# Patient Record
Sex: MALE | Race: BLACK OR AFRICAN AMERICAN | NOT HISPANIC OR LATINO | Employment: OTHER | URBAN - METROPOLITAN AREA
[De-identification: names, ages, dates, MRNs, and addresses within clinical notes are randomized per-mention and may not be internally consistent; named-entity substitution may affect disease eponyms.]

---

## 2017-03-07 ENCOUNTER — HISTORICAL (OUTPATIENT)
Dept: LAB | Facility: HOSPITAL | Age: 67
End: 2017-03-07

## 2017-07-18 ENCOUNTER — HISTORICAL (OUTPATIENT)
Dept: ADMINISTRATIVE | Facility: HOSPITAL | Age: 67
End: 2017-07-18

## 2017-12-04 ENCOUNTER — HISTORICAL (OUTPATIENT)
Dept: ADMINISTRATIVE | Facility: HOSPITAL | Age: 67
End: 2017-12-04

## 2017-12-28 ENCOUNTER — HISTORICAL (OUTPATIENT)
Dept: PREADMISSION TESTING | Facility: HOSPITAL | Age: 67
End: 2017-12-28

## 2017-12-28 LAB
ABS NEUT (OLG): 2.21 X10(3)/MCL (ref 2.1–9.2)
ALBUMIN SERPL-MCNC: 4 GM/DL (ref 3.4–5)
ALBUMIN/GLOB SERPL: 1.1 {RATIO}
ALP SERPL-CCNC: 50 UNIT/L (ref 50–136)
ALT SERPL-CCNC: 26 UNIT/L (ref 12–78)
APPEARANCE, UA: CLEAR
APTT PPP: 30.5 SECOND(S) (ref 24.8–36.9)
AST SERPL-CCNC: 17 UNIT/L (ref 15–37)
BACTERIA SPEC CULT: ABNORMAL /HPF
BASOPHILS # BLD AUTO: 0.1 X10(3)/MCL (ref 0–0.2)
BASOPHILS NFR BLD AUTO: 2 %
BILIRUB SERPL-MCNC: 0.7 MG/DL (ref 0.2–1)
BILIRUB UR QL STRIP: NEGATIVE
BILIRUBIN DIRECT+TOT PNL SERPL-MCNC: 0.1 MG/DL (ref 0–0.2)
BILIRUBIN DIRECT+TOT PNL SERPL-MCNC: 0.6 MG/DL (ref 0–0.8)
BUN SERPL-MCNC: 12 MG/DL (ref 7–18)
CALCIUM SERPL-MCNC: 9 MG/DL (ref 8.5–10.1)
CHLORIDE SERPL-SCNC: 105 MMOL/L (ref 98–107)
CHOLEST SERPL-MCNC: 249 MG/DL (ref 0–200)
CHOLEST/HDLC SERPL: 5.7 {RATIO} (ref 0–5)
CO2 SERPL-SCNC: 24 MMOL/L (ref 21–32)
COLOR UR: YELLOW
CREAT SERPL-MCNC: 1.08 MG/DL (ref 0.7–1.3)
EOSINOPHIL # BLD AUTO: 0.2 X10(3)/MCL (ref 0–0.9)
EOSINOPHIL NFR BLD AUTO: 4 %
ERYTHROCYTE [DISTWIDTH] IN BLOOD BY AUTOMATED COUNT: 12.7 % (ref 11.5–17)
GLOBULIN SER-MCNC: 3.8 GM/DL (ref 2.4–3.5)
GLUCOSE (UA): NEGATIVE
GLUCOSE SERPL-MCNC: 104 MG/DL (ref 74–106)
HCT VFR BLD AUTO: 45.3 % (ref 42–52)
HDLC SERPL-MCNC: 44 MG/DL (ref 35–60)
HGB BLD-MCNC: 15 GM/DL (ref 14–18)
HGB UR QL STRIP: ABNORMAL
INR PPP: 1.02 (ref 0–1.27)
KETONES UR QL STRIP: NEGATIVE
LDLC SERPL CALC-MCNC: 166 MG/DL (ref 0–129)
LEUKOCYTE ESTERASE UR QL STRIP: NEGATIVE
LYMPHOCYTES # BLD AUTO: 2.3 X10(3)/MCL (ref 0.6–4.6)
LYMPHOCYTES NFR BLD AUTO: 44 %
MCH RBC QN AUTO: 30.7 PG (ref 27–31)
MCHC RBC AUTO-ENTMCNC: 33.1 GM/DL (ref 33–36)
MCV RBC AUTO: 92.6 FL (ref 80–94)
MONOCYTES # BLD AUTO: 0.4 X10(3)/MCL (ref 0.1–1.3)
MONOCYTES NFR BLD AUTO: 8 %
MRSA SCREEN BY PCR: NEGATIVE
NEUTROPHILS # BLD AUTO: 2.21 X10(3)/MCL (ref 1.4–7.9)
NEUTROPHILS NFR BLD AUTO: 42 %
NITRITE UR QL STRIP: NEGATIVE
PH UR STRIP: 6 [PH] (ref 5–9)
PLATELET # BLD AUTO: 197 X10(3)/MCL (ref 130–400)
PMV BLD AUTO: 11.5 FL (ref 9.4–12.4)
POTASSIUM SERPL-SCNC: 4.1 MMOL/L (ref 3.5–5.1)
PROT SERPL-MCNC: 7.8 GM/DL (ref 6.4–8.2)
PROT UR QL STRIP: NEGATIVE
PROTHROMBIN TIME: 13.7 SECOND(S) (ref 12.2–14.7)
PSA SERPL-MCNC: 2.04 NG/ML (ref 0–4)
RBC # BLD AUTO: 4.89 X10(6)/MCL (ref 4.7–6.1)
RBC #/AREA URNS HPF: ABNORMAL /[HPF]
SODIUM SERPL-SCNC: 140 MMOL/L (ref 136–145)
SP GR UR STRIP: 1.02 (ref 1–1.03)
SQUAMOUS EPITHELIAL, UA: ABNORMAL
TRANSFERRIN SERPL-MCNC: 246 MG/DL (ref 200–360)
TRIGL SERPL-MCNC: 197 MG/DL (ref 30–150)
TSH SERPL-ACNC: 2.72 MIU/ML (ref 0.36–3.74)
UROBILINOGEN UR STRIP-ACNC: 1
VLDLC SERPL CALC-MCNC: 39 MG/DL
WBC # SPEC AUTO: 5.2 X10(3)/MCL (ref 4.5–11.5)
WBC #/AREA URNS HPF: ABNORMAL /HPF

## 2018-02-20 ENCOUNTER — HISTORICAL (OUTPATIENT)
Dept: ADMINISTRATIVE | Facility: HOSPITAL | Age: 68
End: 2018-02-20

## 2018-02-22 ENCOUNTER — HISTORICAL (OUTPATIENT)
Dept: ADMINISTRATIVE | Facility: HOSPITAL | Age: 68
End: 2018-02-22

## 2018-02-22 ENCOUNTER — HISTORICAL (OUTPATIENT)
Dept: LAB | Facility: HOSPITAL | Age: 68
End: 2018-02-22

## 2018-02-22 LAB
ABS NEUT (OLG): 3.68 X10(3)/MCL (ref 2.1–9.2)
ALBUMIN SERPL-MCNC: 3.7 GM/DL (ref 3.4–5)
ALBUMIN/GLOB SERPL: 1 {RATIO}
ALP SERPL-CCNC: 64 UNIT/L (ref 50–136)
ALT SERPL-CCNC: 18 UNIT/L (ref 12–78)
APPEARANCE, UA: CLEAR
APTT PPP: 34.2 SECOND(S) (ref 24.8–36.9)
AST SERPL-CCNC: 12 UNIT/L (ref 15–37)
BACTERIA SPEC CULT: ABNORMAL /HPF
BASOPHILS # BLD AUTO: 0.1 X10(3)/MCL (ref 0–0.2)
BASOPHILS NFR BLD AUTO: 1 %
BILIRUB SERPL-MCNC: 0.4 MG/DL (ref 0.2–1)
BILIRUB UR QL STRIP: ABNORMAL
BILIRUBIN DIRECT+TOT PNL SERPL-MCNC: 0.1 MG/DL (ref 0–0.2)
BILIRUBIN DIRECT+TOT PNL SERPL-MCNC: 0.3 MG/DL (ref 0–0.8)
BUN SERPL-MCNC: 14 MG/DL (ref 7–18)
CALCIUM SERPL-MCNC: 9.5 MG/DL (ref 8.5–10.1)
CHLORIDE SERPL-SCNC: 105 MMOL/L (ref 98–107)
CO2 SERPL-SCNC: 26 MMOL/L (ref 21–32)
COLOR UR: YELLOW
CREAT SERPL-MCNC: 1.1 MG/DL (ref 0.7–1.3)
EOSINOPHIL # BLD AUTO: 0.2 X10(3)/MCL (ref 0–0.9)
EOSINOPHIL NFR BLD AUTO: 2 %
ERYTHROCYTE [DISTWIDTH] IN BLOOD BY AUTOMATED COUNT: 12.1 % (ref 11.5–17)
GLOBULIN SER-MCNC: 3.8 GM/DL (ref 2.4–3.5)
GLUCOSE (UA): NEGATIVE
GLUCOSE SERPL-MCNC: 146 MG/DL (ref 74–106)
HCT VFR BLD AUTO: 37.6 % (ref 42–52)
HGB BLD-MCNC: 12.2 GM/DL (ref 14–18)
HGB UR QL STRIP: ABNORMAL
INR PPP: 1.13 (ref 0–1.27)
KETONES UR QL STRIP: NEGATIVE
LEUKOCYTE ESTERASE UR QL STRIP: NEGATIVE
LYMPHOCYTES # BLD AUTO: 2.1 X10(3)/MCL (ref 0.6–4.6)
LYMPHOCYTES NFR BLD AUTO: 33 %
MCH RBC QN AUTO: 31.6 PG (ref 27–31)
MCHC RBC AUTO-ENTMCNC: 32.4 GM/DL (ref 33–36)
MCV RBC AUTO: 97.4 FL (ref 80–94)
MONOCYTES # BLD AUTO: 0.3 X10(3)/MCL (ref 0.1–1.3)
MONOCYTES NFR BLD AUTO: 5 %
NEUTROPHILS # BLD AUTO: 3.68 X10(3)/MCL (ref 1.4–7.9)
NEUTROPHILS NFR BLD AUTO: 58 %
NITRITE UR QL STRIP: NEGATIVE
PH UR STRIP: 5.5 [PH] (ref 5–9)
PLATELET # BLD AUTO: 302 X10(3)/MCL (ref 130–400)
PMV BLD AUTO: 9.8 FL (ref 9.4–12.4)
POTASSIUM SERPL-SCNC: 4 MMOL/L (ref 3.5–5.1)
PROT SERPL-MCNC: 7.5 GM/DL (ref 6.4–8.2)
PROT UR QL STRIP: NEGATIVE
PROTHROMBIN TIME: 14.9 SECOND(S) (ref 12.2–14.7)
RBC # BLD AUTO: 3.86 X10(6)/MCL (ref 4.7–6.1)
RBC #/AREA URNS HPF: 2 /HPF (ref 0–2)
SODIUM SERPL-SCNC: 139 MMOL/L (ref 136–145)
SP GR UR STRIP: 1.02 (ref 1–1.03)
SQUAMOUS EPITHELIAL, UA: 1 /HPF (ref 0–4)
TRANSFERRIN SERPL-MCNC: 191 MG/DL (ref 200–360)
UA WBC MAN: ABNORMAL
UROBILINOGEN UR STRIP-ACNC: 1
WBC # SPEC AUTO: 6.3 X10(3)/MCL (ref 4.5–11.5)
WBC #/AREA URNS HPF: ABNORMAL /[HPF]

## 2018-02-24 LAB — FINAL CULTURE: NORMAL

## 2018-03-15 ENCOUNTER — HISTORICAL (OUTPATIENT)
Dept: ADMINISTRATIVE | Facility: HOSPITAL | Age: 68
End: 2018-03-15

## 2018-03-29 ENCOUNTER — HISTORICAL (OUTPATIENT)
Dept: ADMINISTRATIVE | Facility: HOSPITAL | Age: 68
End: 2018-03-29

## 2018-06-26 ENCOUNTER — HISTORICAL (OUTPATIENT)
Dept: ADMINISTRATIVE | Facility: HOSPITAL | Age: 68
End: 2018-06-26

## 2018-12-21 ENCOUNTER — HISTORICAL (OUTPATIENT)
Dept: LAB | Facility: HOSPITAL | Age: 68
End: 2018-12-21

## 2018-12-21 LAB
ABS NEUT (OLG): 2.28 X10(3)/MCL (ref 2.1–9.2)
ALBUMIN SERPL-MCNC: 4 GM/DL (ref 3.4–5)
ALBUMIN/GLOB SERPL: 1.2 RATIO (ref 1.1–2)
ALP SERPL-CCNC: 62 UNIT/L (ref 46–116)
ALT SERPL-CCNC: 28 UNIT/L (ref 12–78)
AST SERPL-CCNC: 19 UNIT/L (ref 15–37)
BASOPHILS # BLD AUTO: 0 X10(3)/MCL (ref 0–0.2)
BASOPHILS NFR BLD AUTO: 1 %
BILIRUB SERPL-MCNC: 0.3 MG/DL (ref 0.2–1)
BILIRUBIN DIRECT+TOT PNL SERPL-MCNC: 0.09 MG/DL (ref 0–0.2)
BILIRUBIN DIRECT+TOT PNL SERPL-MCNC: 0.21 MG/DL (ref 0–0.8)
BUN SERPL-MCNC: 10.8 MG/DL (ref 7–18)
CALCIUM SERPL-MCNC: 9.4 MG/DL (ref 8.5–10.1)
CHLORIDE SERPL-SCNC: 105 MMOL/L (ref 98–107)
CHOLEST SERPL-MCNC: 151 MG/DL (ref 0–200)
CHOLEST/HDLC SERPL: 3.8 {RATIO} (ref 0–5)
CO2 SERPL-SCNC: 29.1 MMOL/L (ref 21–32)
CREAT SERPL-MCNC: 1.09 MG/DL (ref 0.6–1.3)
EOSINOPHIL # BLD AUTO: 0.2 X10(3)/MCL (ref 0–0.9)
EOSINOPHIL NFR BLD AUTO: 4 %
ERYTHROCYTE [DISTWIDTH] IN BLOOD BY AUTOMATED COUNT: 12.5 % (ref 11.5–17)
GLOBULIN SER-MCNC: 3.3 GM/DL (ref 2.4–3.5)
GLUCOSE SERPL-MCNC: 120 MG/DL (ref 74–106)
HCT VFR BLD AUTO: 42.9 % (ref 42–52)
HDLC SERPL-MCNC: 40 MG/DL (ref 40–60)
HGB BLD-MCNC: 14 GM/DL (ref 14–18)
IMM GRANULOCYTES # BLD AUTO: 0.01 % (ref 0–0.02)
IMM GRANULOCYTES NFR BLD AUTO: 0.2 % (ref 0–0.43)
LDLC SERPL CALC-MCNC: 87 MG/DL (ref 0–129)
LYMPHOCYTES # BLD AUTO: 2.1 X10(3)/MCL (ref 0.6–4.6)
LYMPHOCYTES NFR BLD AUTO: 42 %
MAGNESIUM SERPL-MCNC: 2 MG/DL (ref 1.8–2.4)
MCH RBC QN AUTO: 30.9 PG (ref 27–31)
MCHC RBC AUTO-ENTMCNC: 32.6 GM/DL (ref 33–36)
MCV RBC AUTO: 94.7 FL (ref 80–94)
MONOCYTES # BLD AUTO: 0.4 X10(3)/MCL (ref 0.1–1.3)
MONOCYTES NFR BLD AUTO: 8 %
NEUTROPHILS # BLD AUTO: 2.28 X10(3)/MCL (ref 1.4–7.9)
NEUTROPHILS NFR BLD AUTO: 45 %
PLATELET # BLD AUTO: 209 X10(3)/MCL (ref 130–400)
PMV BLD AUTO: 10.8 FL (ref 9.4–12.4)
POTASSIUM SERPL-SCNC: 3.9 MMOL/L (ref 3.5–5.1)
PROT SERPL-MCNC: 7.3 GM/DL (ref 6.4–8.2)
RBC # BLD AUTO: 4.53 X10(6)/MCL (ref 4.7–6.1)
SODIUM SERPL-SCNC: 142 MMOL/L (ref 136–145)
TRIGL SERPL-MCNC: 120 MG/DL
URATE SERPL-MCNC: 6.1 MG/DL (ref 3.4–7)
VLDLC SERPL CALC-MCNC: 24 MG/DL
WBC # SPEC AUTO: 5 X10(3)/MCL (ref 4.5–11.5)

## 2019-02-19 ENCOUNTER — HISTORICAL (OUTPATIENT)
Dept: ADMINISTRATIVE | Facility: HOSPITAL | Age: 69
End: 2019-02-19

## 2019-11-13 ENCOUNTER — HISTORICAL (OUTPATIENT)
Dept: LAB | Facility: HOSPITAL | Age: 69
End: 2019-11-13

## 2019-11-13 LAB
ALBUMIN SERPL-MCNC: 4.1 GM/DL (ref 3.4–5)
ALBUMIN/GLOB SERPL: 1.3 RATIO (ref 1.1–2)
ALP SERPL-CCNC: 51 UNIT/L (ref 46–116)
ALT SERPL-CCNC: 32 UNIT/L (ref 12–78)
APPEARANCE, UA: CLEAR
AST SERPL-CCNC: 30 UNIT/L (ref 15–37)
BACTERIA SPEC CULT: ABNORMAL
BILIRUB SERPL-MCNC: 0.4 MG/DL (ref 0.2–1)
BILIRUB UR QL STRIP: NEGATIVE
BILIRUBIN DIRECT+TOT PNL SERPL-MCNC: 0.14 MG/DL (ref 0–0.2)
BILIRUBIN DIRECT+TOT PNL SERPL-MCNC: 0.26 MG/DL (ref 0–0.8)
BUN SERPL-MCNC: 13.9 MG/DL (ref 7–18)
CALCIUM SERPL-MCNC: 9 MG/DL (ref 8.5–10.1)
CHLORIDE SERPL-SCNC: 104 MMOL/L (ref 98–107)
CHOLEST SERPL-MCNC: 188 MG/DL (ref 0–200)
CHOLEST/HDLC SERPL: 5.1 {RATIO} (ref 0–5)
CO2 SERPL-SCNC: 29.3 MMOL/L (ref 21–32)
COLOR UR: YELLOW
CREAT SERPL-MCNC: 1.24 MG/DL (ref 0.6–1.3)
ERYTHROCYTE [DISTWIDTH] IN BLOOD BY AUTOMATED COUNT: 12.7 % (ref 11.5–17)
GLOBULIN SER-MCNC: 3.2 GM/DL (ref 2.4–3.5)
GLUCOSE (UA): NEGATIVE
GLUCOSE SERPL-MCNC: 137 MG/DL (ref 74–106)
HCT VFR BLD AUTO: 43.7 % (ref 42–52)
HDLC SERPL-MCNC: 37 MG/DL (ref 40–60)
HGB BLD-MCNC: 13.9 GM/DL (ref 14–18)
HGB UR QL STRIP: ABNORMAL
KETONES UR QL STRIP: NEGATIVE
LDLC SERPL CALC-MCNC: 120 MG/DL (ref 0–129)
LEUKOCYTE ESTERASE UR QL STRIP: NEGATIVE
MCH RBC QN AUTO: 31.2 PG (ref 27–31)
MCHC RBC AUTO-ENTMCNC: 31.8 GM/DL (ref 33–36)
MCV RBC AUTO: 98 FL (ref 80–94)
NITRITE UR QL STRIP: NEGATIVE
PH UR STRIP: 7 [PH] (ref 5–9)
PLATELET # BLD AUTO: 200 X10(3)/MCL (ref 130–400)
PMV BLD AUTO: 11.5 FL (ref 9.4–12.4)
POTASSIUM SERPL-SCNC: 4 MMOL/L (ref 3.5–5.1)
PROT SERPL-MCNC: 7.3 GM/DL (ref 6.4–8.2)
PROT UR QL STRIP: NEGATIVE
PSA SERPL-MCNC: 2.17 NG/ML (ref 0–4)
RBC # BLD AUTO: 4.46 X10(6)/MCL (ref 4.7–6.1)
RBC #/AREA URNS HPF: ABNORMAL /HPF
SODIUM SERPL-SCNC: 143 MMOL/L (ref 136–145)
SP GR UR STRIP: 1.02 (ref 1–1.03)
SQUAMOUS EPITHELIAL, UA: ABNORMAL
TRIGL SERPL-MCNC: 154 MG/DL
TSH SERPL-ACNC: 3.14 MIU/ML (ref 0.36–3.74)
UROBILINOGEN UR STRIP-ACNC: 1
VLDLC SERPL CALC-MCNC: 31 MG/DL
WBC # SPEC AUTO: 5.4 X10(3)/MCL (ref 4.5–11.5)
WBC #/AREA URNS HPF: ABNORMAL /HPF

## 2020-01-09 ENCOUNTER — HISTORICAL (OUTPATIENT)
Dept: RADIOLOGY | Facility: HOSPITAL | Age: 70
End: 2020-01-09

## 2020-02-20 ENCOUNTER — HISTORICAL (OUTPATIENT)
Dept: ADMINISTRATIVE | Facility: HOSPITAL | Age: 70
End: 2020-02-20

## 2020-04-09 ENCOUNTER — HISTORICAL (OUTPATIENT)
Dept: URGENT CARE | Facility: CLINIC | Age: 70
End: 2020-04-09

## 2022-04-10 ENCOUNTER — HISTORICAL (OUTPATIENT)
Dept: ADMINISTRATIVE | Facility: HOSPITAL | Age: 72
End: 2022-04-10

## 2022-04-27 VITALS
SYSTOLIC BLOOD PRESSURE: 102 MMHG | WEIGHT: 273.38 LBS | HEIGHT: 74 IN | DIASTOLIC BLOOD PRESSURE: 66 MMHG | BODY MASS INDEX: 35.08 KG/M2

## 2022-05-03 ENCOUNTER — LAB VISIT (OUTPATIENT)
Dept: LAB | Facility: HOSPITAL | Age: 72
End: 2022-05-03
Attending: FAMILY MEDICINE
Payer: MEDICARE

## 2022-05-03 DIAGNOSIS — R37 SEXUAL DYSFUNCTION: ICD-10-CM

## 2022-05-03 DIAGNOSIS — L91.8 SKIN TAG: ICD-10-CM

## 2022-05-03 DIAGNOSIS — M25.569 KNEE PAIN, UNSPECIFIED CHRONICITY, UNSPECIFIED LATERALITY: ICD-10-CM

## 2022-05-03 DIAGNOSIS — J30.9 ALLERGIC RHINITIS: Primary | ICD-10-CM

## 2022-05-03 DIAGNOSIS — R31.9 HEMATURIA, UNSPECIFIED TYPE: ICD-10-CM

## 2022-05-03 DIAGNOSIS — I10 ESSENTIAL HYPERTENSION, MALIGNANT: ICD-10-CM

## 2022-05-03 DIAGNOSIS — Z00.00 WELL ADULT EXAM: ICD-10-CM

## 2022-05-03 DIAGNOSIS — Z12.5 ENCOUNTER FOR SCREENING FOR MALIGNANT NEOPLASM OF PROSTATE: ICD-10-CM

## 2022-05-03 DIAGNOSIS — L98.9 FIBROHISTIOCYTIC PROLIFERATION OF THE SKIN: ICD-10-CM

## 2022-05-03 DIAGNOSIS — R35.0 URINARY FREQUENCY: ICD-10-CM

## 2022-05-03 DIAGNOSIS — F41.9 ANXIETY: ICD-10-CM

## 2022-05-03 DIAGNOSIS — E78.5 HYPERLIPIDEMIA, UNSPECIFIED HYPERLIPIDEMIA TYPE: ICD-10-CM

## 2022-05-03 DIAGNOSIS — G47.00 INSOMNIA, UNSPECIFIED TYPE: ICD-10-CM

## 2022-05-03 LAB
ALBUMIN SERPL-MCNC: 4.1 GM/DL (ref 3.4–4.8)
ALBUMIN/GLOB SERPL: 1.2 RATIO (ref 1.1–2)
ALP SERPL-CCNC: 46 UNIT/L (ref 40–150)
ALT SERPL-CCNC: 19 UNIT/L (ref 0–55)
APPEARANCE UR: CLEAR
AST SERPL-CCNC: 19 UNIT/L (ref 5–34)
BACTERIA #/AREA URNS AUTO: ABNORMAL /HPF
BILIRUB UR QL STRIP.AUTO: NEGATIVE MG/DL
BILIRUBIN DIRECT+TOT PNL SERPL-MCNC: 0.2 MG/DL (ref 0–0.5)
BILIRUBIN DIRECT+TOT PNL SERPL-MCNC: 0.4 MG/DL (ref 0–0.8)
BILIRUBIN DIRECT+TOT PNL SERPL-MCNC: 0.6 MG/DL
BUN SERPL-MCNC: 9.5 MG/DL (ref 8.4–25.7)
CALCIUM SERPL-MCNC: 9.4 MG/DL (ref 8.8–10)
CHLORIDE SERPL-SCNC: 108 MMOL/L (ref 98–107)
CHOLEST SERPL-MCNC: 229 MG/DL
CHOLEST/HDLC SERPL: 6 {RATIO} (ref 0–5)
CO2 SERPL-SCNC: 20 MMOL/L (ref 23–31)
COLOR UR AUTO: YELLOW
CREAT SERPL-MCNC: 0.9 MG/DL (ref 0.73–1.18)
GLOBULIN SER-MCNC: 3.3 GM/DL (ref 2.4–3.5)
GLUCOSE SERPL-MCNC: 180 MG/DL (ref 82–115)
GLUCOSE UR QL STRIP.AUTO: NEGATIVE MG/DL
HBV SURFACE AG SERPL QL IA: NONREACTIVE
HCV AB SERPL QL IA: NONREACTIVE
HDLC SERPL-MCNC: 41 MG/DL (ref 35–60)
KETONES UR QL STRIP.AUTO: NEGATIVE MG/DL
LDLC SERPL CALC-MCNC: 154 MG/DL (ref 50–140)
LEUKOCYTE ESTERASE UR QL STRIP.AUTO: NEGATIVE UNIT/L
NITRITE UR QL STRIP.AUTO: NEGATIVE
PH UR STRIP.AUTO: 5 [PH]
POTASSIUM SERPL-SCNC: 4.5 MMOL/L (ref 3.5–5.1)
PROT SERPL-MCNC: 7.4 GM/DL (ref 5.8–7.6)
PROT UR QL STRIP.AUTO: NEGATIVE MG/DL
PSA SERPL-MCNC: 2.94 NG/ML
RBC #/AREA URNS AUTO: ABNORMAL /HPF
RBC UR QL AUTO: ABNORMAL UNIT/L
SODIUM SERPL-SCNC: 139 MMOL/L (ref 136–145)
SP GR UR STRIP.AUTO: 1.02
SQUAMOUS #/AREA URNS AUTO: ABNORMAL /LPF
TRIGL SERPL-MCNC: 172 MG/DL (ref 34–140)
TSH SERPL-ACNC: 1.61 UIU/ML (ref 0.35–4.94)
UROBILINOGEN UR STRIP-ACNC: 0.2 MG/DL
VLDLC SERPL CALC-MCNC: 34 MG/DL
WBC #/AREA URNS AUTO: ABNORMAL /HPF

## 2022-05-03 PROCEDURE — 80053 COMPREHEN METABOLIC PANEL: CPT

## 2022-05-03 PROCEDURE — 87340 HEPATITIS B SURFACE AG IA: CPT

## 2022-05-03 PROCEDURE — 80061 LIPID PANEL: CPT

## 2022-05-03 PROCEDURE — 81015 MICROSCOPIC EXAM OF URINE: CPT | Mod: XB

## 2022-05-03 PROCEDURE — 84153 ASSAY OF PSA TOTAL: CPT

## 2022-05-03 PROCEDURE — 36415 COLL VENOUS BLD VENIPUNCTURE: CPT

## 2022-05-03 PROCEDURE — 84443 ASSAY THYROID STIM HORMONE: CPT

## 2022-05-03 PROCEDURE — 81003 URINALYSIS AUTO W/O SCOPE: CPT

## 2022-05-03 PROCEDURE — 86803 HEPATITIS C AB TEST: CPT

## 2022-05-04 NOTE — HISTORICAL [facility]
This is a historical note converted from Darrel. Formatting and pictures may have been removed.  Please reference Darrel for original formatting and attached multimedia. Chief Complaint  PT HERE TO CHECK UP ON SOME KNEE PAIN HE HAD OVER THE WEEKEND. HE WENT TO THE ER AND HAD AN U/S WHICH WAS NEGATIVE. PT GOT X-RAYS TODAY...CV  History of Present Illness  2/20/2018 this patient comes in?with acute pain that he had over the weekend. ?He actually went to the emergency room?and got ultrasound that shows that his ultrasound was negative.? He states he did not have any associated trauma he states it is just started?acutely and he could not walk.  Physical Exam  Vitals & Measurements  HT:?188?cm? HT:?188?cm? WT:?118?kg? WT:?118?kg? BMI:?33.39?  Difficult to obtain knee exam due to pain and also this patient has an inability to ambulate.  Assessment/Plan  1.?Left medial tibial plateau fracture  ?See below  Ordered:  Durable Medical Equipment  External Referral  ?  2.?Right medial tibial plateau fracture  ?See below  Ordered:  Durable Medical Equipment  External Referral  ?  3.?Status post left partial knee replacement  ? Patient has sustained bilateral medial plateau fractures.? This is demonstrated on his x-ray we will?give him another wheelchair and he will be nonweightbearing.? I spoke with Dr. Andres and he will see him this Thursday for possible revision surgery.  Ordered:  acetaminophen-HYDROcodone, 1 tab(s), Oral, q4hr, PRN PRN for pain, # 40 tab(s), 0 Refill(s), Pharmacy: [id number]  Post-Op follow-up visit [id number] PC  ?  4.?Status post right partial knee replacement  Ordered:  acetaminophen-HYDROcodone, 1 tab(s), Oral, q4hr, PRN PRN for pain, # 40 tab(s), 0 Refill(s), Pharmacy: [id number]  Post-Op follow-up visit [id number] PC  ?   Problem List/Past Medical History  Ongoing  Able to lie down  Acid reflux  Arthritis  Exercise tolerance  Hyperlipidemia  Hypertension  Obesity  Steroid  Historical  Contact  lens  Gastric ulcer  Procedure/Surgical History  STEPHANIE CHAPMAN Partial Knee Arthroplasty (Bilateral) (01/10/2018), Replacement of Left Knee Joint with Unicondylar Synthetic Substitute, Cemented, Open Approach (01/10/2018), Replacement of Right Knee Joint with Unicondylar Synthetic Substitute, Cemented, Open Approach (01/10/2018), Robotic Assisted Procedure of Lower Extremity, Open Approach (01/10/2018), Repair Right Anterior Chamber, Percutaneous Approach (01/22/2016), Trabeculoplasty by laser surgery (01/22/2016), Exploration of tendon sheath of hand (08/26/2015), Release Trigger Finger Or Thumb (Right) (08/26/2015), Tendon sheath incision (eg, for trigger finger) (08/26/2015), Achilles tendon repair, Colonoscopy.  Medications  ATORVASTATIN 10 MG TABLET, 10 mg= 1 tab(s), Oral, At Bedtime  LISINOP/HCTZ TAB 10-12.5, 1 tab(s), Oral, Daily  Lovenox 40 mg/0.4 mL subcutaneous solution, 40 mg= 0.4 mL, Subcutaneous, Daily  Norco 5 mg-325 mg oral tablet, 1 tab(s), Oral, q6hr  Norco 7.5 mg-325 mg oral tablet, 1 tab(s), Oral, q4hr, PRN  Phenergan 12.5 mg Tab, 12.5 mg= 1 tab(s), Oral, q6hr, PRN  Allergies  No Known Medication Allergies  Social History  Alcohol  Past, 08/22/2015  Employment/School  Highest education level: Post graduate degree(s)., 08/22/2015  Home/Environment  Lives with Alone. Living situation: Home/Independent. Family/Friends available for support: Yes., 08/22/2015  Nutrition/Health  Regular, 08/22/2015  Substance Abuse - Denies Substance Abuse, 08/22/2015  Tobacco - Denies Tobacco Use, 08/22/2015  Never smoker, 12/28/2017  Family History  Glaucoma.: Mother.  Heart disease 07-JUN-2017 17:48:24<$>: Mother.  Rheumatoid arthritis: Mother.      The diagnosis for this patient is a periprosthetic fracture of the right and left knee.

## 2022-05-04 NOTE — HISTORICAL [facility]
This is a historical note converted from Darrel. Formatting and pictures may have been removed.  Please reference Darrel for original formatting and attached multimedia. Chief Complaint  BILATERAL TKA ON 2/26/18 & 2/28/18  History of Present Illness  67-year-old male presents for follow-up of revision of bilateral total knees. ?Patient had fractures of the medial tibial plateaus to bilateral knees.? Is otherwise doing well today.? In rehab.? Pain is well-controlled. ?He has been compliant?with weightbearing restrictions as he is nonweightbearing to both legs currently.  Review of Systems  Denies fevers, chills, chest pain, shortness of breath. Comprehensive review of systems performed and otherwise negative except as noted in HPI.  Physical Exam  Vitals & Measurements  BP:?129/84?  HT:?188?cm? HT:?188?cm? WT:?109.76?kg? WT:?109.76?kg? BMI:?31.05?  General: No acute distress, alert and oriented, healthy appearing?  HEENT: Head is atraumatic, mucous membranes are moist  Neck: Supples, no JVD  Cardiovascular: Palpable dorsalis pedis and posterior tibial pulses, regular rate and rhythm to those pulses  Lungs: Breathing non-labored  Skin: no rashes appreciated  Neurologic: Can flex and extend knees, ankles, and toes. Sensation is grossly intact  ?  Examination knee:  Incision clean dry and intact. No erythema or drainage or signs of infection.  Sensation intact distally to left foot  Positive FHL/EHL/gastrocsoleus/tib ant  Brisk capillary refill to left foot  No swelling or signs of DVT.  Range of motion: 0 to 95?to bilateral knees  Stable to varus valgus  Stable to anterior and posterior drawer  Assessment/Plan  1.?Aftercare following left knee joint replacement surgery  ?Patient doing quite less revision of both his knees with ORIF of tibial plateaus.? His pain is well-controlled. ?His motion is excellent considering all surgeries had. ?He feels very stable on exam. ?I would like to continue bracing and especially on  his left leg?where his MCL got a little damaged?with all the surgery and fractures.? Otherwise we will keep him nonweightbearing for the next few weeks we will plan to see him back in 2 weeks to hopefully begin him with some partial, limited weightbearing at that time.? We will have the rehab discontinue the sutures and staples in a week and placed Steri-Strips.  Ordered:  Clinic Follow up, *Est. 03/29/18 10:15:00 CDT, Order for future visit, Aftercare following left knee joint replacement surgery, [facility]  External Referral, staple removal, remove staples and place steri strips to Bilateral knees on or after Monday 3/19/18, 03/15/18 9:11:00 CDT, Aftercare following left knee joint replacement surgery  Post-Op follow-up visit [id number] PC, Aftercare following left knee joint replacement surgery, [facility], 03/15/18 9:11:00 CDT  XR Knee Left 1 or 2 Views, Routine, *Est. 03/29/18 3:00:00 CDT, Fracture, None, Stretcher, Patient Has IV?, Rad Type, Order for future visit, Aftercare following left knee joint replacement surgery, Not Scheduled, *Est. 03/29/18 3:00:00 CDT  XR Knee Right 1 or 2 Views, Routine, *Est. 03/29/18 3:00:00 CDT, Fracture, None, Stretcher, Patient Has IV?, Rad Type, Order for future visit, Aftercare following left knee joint replacement surgery, Not Scheduled, *Est. 03/29/18 3:00:00 CDT  ?  Status post bilateral unicompartmental knee replacement  ?   Problem List/Past Medical History  Ongoing  Able to lie down  Acid reflux  Acute disease or injury-related malnutrition  Arthritis  Closed fracture of medial plateau of left tibia  Exercise tolerance  Failed total left knee replacement  Hyperlipidemia  Hypertension  Obesity  Steroid  Historical  Contact lens  Gastric ulcer  Procedure/Surgical History  ORIF Tibia Plateau (Left) (02/28/2018), Removal of Synthetic Substitute from Left Knee Joint, Open Approach (02/28/2018), Replacement of Left Knee Joint with Synthetic Substitute,  Cemented, Open Approach (02/28/2018), Reposition Left Tibia with Internal Fixation Device, Open Approach (02/28/2018), Total Knee Revision (Left) (02/28/2018), ORIF Tibia Plateau (Right) (02/26/2018), Removal of Synthetic Substitute from Right Knee Joint, Open Approach (02/26/2018), Replacement of Right Knee Joint with Synthetic Substitute, Cemented, Open Approach (02/26/2018), Reposition Right Tibia with Internal Fixation Device, Open Approach (02/26/2018), Total Knee Revision (Right) (02/26/2018), STEPHANIE ARI Partial Knee Arthroplasty (Bilateral) (01/10/2018), Replacement of Left Knee Joint with Unicondylar Synthetic Substitute, Cemented, Open Approach (01/10/2018), Replacement of Right Knee Joint with Unicondylar Synthetic Substitute, Cemented, Open Approach (01/10/2018), Robotic Assisted Procedure of Lower Extremity, Open Approach (01/10/2018), Repair Right Anterior Chamber, Percutaneous Approach (01/22/2016), Trabeculoplasty by laser surgery (01/22/2016), Exploration of tendon sheath of hand (08/26/2015), Release Trigger Finger Or Thumb (Right) (08/26/2015), Tendon sheath incision (eg, for trigger finger) (08/26/2015), Achilles tendon repair, Colonoscopy.  Medications  acetaminophen-hydrocodone 325 mg-5 mg oral tablet, 1 tab(s), Oral, q4hr, PRN  acetaminophen-hydrocodone 325 mg-5 mg oral tablet, 2 tab(s), Oral, q4hr, PRN  ATORVASTATIN 10 MG TABLET, 10 mg= 1 tab(s), Oral, At Bedtime  Colace 100 mg oral capsule, 100 mg= 1 cap(s), Oral, Daily  LISINOP/HCTZ TAB 10-12.5, 1 tab(s), Oral, Daily  Lovenox 40 mg/0.4 mL subcutaneous solution, 40 mg= 0.4 mL, Subcutaneous, Daily  Neurontin 300 mg oral capsule, 300 mg= 1 cap(s), Oral, BID  Senokot S 50 mg-8.6 mg oral tablet, 2 tab(s), Oral, Once a day (at bedtime)  Valium 5 mg oral tablet, 5 mg= 1 tab(s), Oral, q6hr, PRN  Allergies  No Known Medication Allergies  Social History  Alcohol  Past, 08/22/2015  Employment/School  Highest education level: Post graduate degree(s).,  08/22/2015  Home/Environment  Lives with Alone. Living situation: Home/Independent. Family/Friends available for support: Yes., 08/22/2015  Nutrition/Health  Regular, 08/22/2015  Substance Abuse - Denies Substance Abuse, 08/22/2015  Tobacco - Denies Tobacco Use, 08/22/2015  Never smoker, 12/28/2017  Family History  Glaucoma.: Mother.  Heart disease 07-JUN-2017 17:48:24<$>: Mother.  Rheumatoid arthritis: Mother.  Diagnostic Results  AP lateral bilateral knees taken and reviewed. ?Fracture is well aligned and well fixed. ?Implants in good position.

## 2022-05-04 NOTE — HISTORICAL [facility] CERNER
This is a historical note converted from Darrel. Formatting and pictures may have been removed.  Please reference Darrel for original formatting and attached multimedia. Chief Complaint  BILATERAL KNEE REVISIONS ON 2/27/18. DOING VERY WELL.  History of Present Illness  68-year-old man?presents today for follow-up of revision to bilateral total knees. ?He is here for surgery. ?Is actually doing quite well. ?Using no assistive devices. ?Does complain of some pain?to his left knee at times although mild.  Review of Systems  Denies fevers, chills, chest pain, shortness of breath. Comprehensive review of systems performed and otherwise negative except as noted in HPI.  Physical Exam  Vitals & Measurements  HT:?188?cm? WT:?109.76?kg? BMI:?31.05?  General: No acute distress, alert and oriented, healthy appearing?  HEENT: Head is atraumatic, mucous membranes are moist  Neck: Supples, no JVD  Cardiovascular: Palpable dorsalis pedis and posterior tibial pulses, regular rate and rhythm to those pulses  Lungs: Breathing non-labored  Skin: no rashes appreciated  Neurologic: Can flex and extend knees, ankles, and toes. Sensation is grossly intact  ?  Examination knee:  Incision clean dry and intact. No erythema or drainage or signs of infection.  Sensation intact distally to left foot  Positive FHL/EHL/gastrocsoleus/tib ant  Brisk capillary refill to left foot  No swelling or signs of DVT.  Range of motion: 0 to 115  Stable to varus valgus  Stable to anterior and posterior drawer  Patient stable exam to MCL bilaterally.  Assessment/Plan  1.?Aftercare following right knee joint replacement surgery?Z47.1,?  ?Patient status post revision of bilateral knees. ?He is actually doing very well. ?MCL is stable. ?Fracture is well-healed. ?Implants appear stable. We will plan to see him back in a year or sooner should there be any issues.  Patient status post revision of bilateral knees. ?He is actually doing very well. ?MCL is stable.  ?Fracture is well-healed. ?Implants appear stable. ? We will plan to see him back in a year or sooner should there be any issues. Aftercare following left knee joint replacement surgery?Z47.1  Ordered:  Clinic Follow up, *Est. 02/19/20 3:00:00 CST, Order for future visit, Aftercare following right knee joint replacement surgery, [facility]  Office/Outpatient Visit Level 3 Established [id number] PC, Aftercare following right knee joint replacement surgery, [facility], 02/19/19 9:17:00 CST  XR Knee Left 4 or More Views, Routine, *Est. 02/19/20 3:00:00 CST, Arthritis, None, Stretcher, Patient Has IV?, Rad Type, Order for future visit, Aftercare following right knee joint replacement surgery, Not Scheduled, *Est. 02/19/20 3:00:00 CST  XR Knee Right 4 or More Views, Routine, *Est. 02/19/20 3:00:00 CST, Arthritis, None, Stretcher, Patient Has IV?, Rad Type, Order for future visit, Aftercare following right knee joint replacement surgery, Not Scheduled, *Est. 02/19/20 3:00:00 CST  ?   Problem List/Past Medical History  Ongoing  Able to lie down  Acid reflux  Acute disease or injury-related malnutrition  Arthritis  Closed fracture of medial plateau of left tibia  Exercise tolerance  Failed total left knee replacement  Hyperlipidemia  Hypertension  Obesity  Steroid  Historical  Contact lens  Gastric ulcer  Procedure/Surgical History  ORIF Tibia Plateau (Left) (02/28/2018)  Total Knee Revision (Left) (02/28/2018)  ORIF Tibia Plateau (Right) (02/26/2018)  Total Knee Revision (Right) (02/26/2018)  [facility] Partial Knee Arthroplasty (Bilateral) (01/10/2018)  Release Trigger Finger Or Thumb (Right) (08/26/2015)  Achilles tendon repair  Colonoscopy   Medications  acetaminophen-hydrocodone 325 mg-5 mg oral tablet, 1 tab(s), Oral, q4hr, PRN,? ?Not taking  acetaminophen-hydrocodone 325 mg-5 mg oral tablet, 2 tab(s), Oral, q4hr, PRN,? ?Not taking  ATORVASTATIN 10 MG TABLET, 10 mg= 1 tab(s), Oral, At Bedtime,? ?Not taking  Colace  100 mg oral capsule, 100 mg= 1 cap(s), Oral, Daily,? ?Not taking  LISINOP/HCTZ TAB 10-12.5, 1 tab(s), Oral, Daily  Lovenox 40 mg/0.4 mL subcutaneous solution, 40 mg= 0.4 mL, Subcutaneous, Daily,? ?Not taking  Neurontin 300 mg oral capsule, 300 mg= 1 cap(s), Oral, BID  Senokot S 50 mg-8.6 mg oral tablet, 2 tab(s), Oral, Once a day (at bedtime),? ?Not taking  Valium 5 mg oral tablet, 5 mg= 1 tab(s), Oral, q6hr, PRN,? ?Not taking  Allergies  No Known Medication Allergies  Social History  Alcohol  Past, 08/22/2015  Employment/School  Highest education level: Post graduate degree(s)., 08/22/2015  Home/Environment  Lives with Alone. Living situation: Home/Independent. Family/Friends available for support: Yes., 08/22/2015  Nutrition/Health  Regular, 08/22/2015  Substance Abuse - Denies Substance Abuse, 08/22/2015  Tobacco - Denies Tobacco Use, 08/22/2015  Never (less than 100 in lifetime), N/A, 02/19/2019  Never smoker, 12/28/2017  Family History  Glaucoma.: Mother.  Heart disease 07-JUN-2017 17:48:24<$>: Mother.  Rheumatoid arthritis: Mother.  Health Maintenance  Health Maintenance  ???Pending?(in the next year)  ??? ??Due?  ??? ? ? ?ADL Screening due??02/19/19??and every 1??year(s)  ??? ? ? ?Alcohol Misuse Screening due??02/19/19??and every 1??year(s)  ??? ? ? ?Aspirin Therapy for CVD Prevention due??02/19/19??and every 1??year(s)  ??? ? ? ?Cognitive Screening due??02/19/19??and every 1??year(s)  ??? ? ? ?Colorectal Screening (Senior Wellness) due??02/19/19??and every 10??year(s)  ??? ? ? ?Fall Risk Assessment due??02/19/19??and every 1??year(s)  ??? ? ? ?Functional Assessment due??02/19/19??and every 1??year(s)  ??? ? ? ?Geriatric Depression Screening due??02/19/19??and every 1??year(s)  ??? ? ? ?Hypertension Management-Education due??02/19/19??and every 1??year(s)  ??? ? ? ?Pneumococcal Vaccine due??02/19/19??Variable frequency  ??? ? ? ?Pneumococcal Vaccine due??02/19/19??and every?  ??? ? ? ?Smoking Cessation  due??02/19/19??Variable frequency  ??? ? ? ?Tetanus Vaccine due??02/19/19??and every 10??year(s)  ??? ? ? ?Zoster Vaccine due??02/19/19??and every 100??year(s)  ??? ??Due In Future?  ??? ? ? ?Advance Directive not due until??06/26/19??and every 1??year(s)  ??? ? ? ?Hypertension Management-Blood Pressure not due until??08/23/19??and every 1??year(s)  ??? ? ? ?Hypertension Management-BMP not due until??12/21/19??and every 1??year(s)  ???Satisfied?(in the past 1 year)  ??? ??Satisfied?  ??? ? ? ?Advance Directive on??06/26/18.??Satisfied by Clair Biggs  ??? ? ? ?Blood Pressure Screening on??08/23/18.??Satisfied by Logan Saenz  ??? ? ? ?Body Mass Index Check on??02/19/19.??Satisfied by Clair Biggs  ??? ? ? ?Depression Screening on??02/19/19.??Satisfied by Clair Biggs  ??? ? ? ?Diabetes Screening on??12/21/18.??Satisfied by Tracey Shook  ??? ? ? ?Hypertension Management-BMP on??12/21/18.??Satisfied by Tracey Shook  ??? ? ? ?Influenza Vaccine on??02/22/18.??Satisfied by Annabella FANG, Pamella Carney  ??? ? ? ?Lipid Screening on??12/21/18.??Satisfied by Tracey Shook  ??? ? ? ?Obesity Screening on??02/19/19.??Satisfied by Clair Biggs  ?  ?  Diagnostic Results  AP lateral bilateral knees reviewed. ?Patients fracture is well-healed. ?Implants in good position. ?No signs of loosening or subsidence.

## 2022-05-04 NOTE — HISTORICAL [facility] CERNER
This is a historical note converted from Darrel. Formatting and pictures may have been removed.  Please reference Darrel for original formatting and attached multimedia. Chief Complaint  S/P BILATERAL TKA ON 3/1/18. STILL NON WEIGHT BARING IN A WHEELCHAIR.  History of Present Illness  67-year-old male presents today for follow-up of revisions of bilateral knees with ORIF bilateral tibial plateau.? Patient is doing fairly well with therapy. ?Denies any severe complaints of pain. ?He is working on range of motion and gait training. ?Patient has been compliant with nonweightbearing restrictions to bilateral lower extremities.  Review of Systems  Denies fevers, chills, chest pain, shortness of breath. Comprehensive review of systems performed and otherwise negative except as noted in HPI.  Physical Exam  Vitals & Measurements  BP:?129/84?  HT:?188?cm? HT:?188?cm? WT:?109.76?kg? WT:?109.76?kg? BMI:?31.05?  General: No acute distress, alert and oriented, healthy appearing?  HEENT: Head is atraumatic, mucous membranes are moist  Neck: Supples, no JVD  Cardiovascular: Palpable dorsalis pedis and posterior tibial pulses, regular rate and rhythm to those pulses  Lungs: Breathing non-labored  Skin: no rashes appreciated  Neurologic: Can flex and extend knees, ankles, and toes. Sensation is grossly intact  ?  Examination?right knee:  Incision clean dry and intact. No erythema or drainage or signs of infection.  Sensation intact distally to left foot  Positive FHL/EHL/gastrocsoleus/tib ant  Brisk capillary refill to left foot  No swelling or signs of DVT.  Range of motion: 5 to 100  Stable to varus valgus  Stable to anterior and posterior drawer  ?   Examination?left knee:  Incision clean dry and intact. No erythema or drainage or signs of infection.  Sensation intact distally to left foot  Positive FHL/EHL/gastrocsoleus/tib ant  Brisk capillary refill to left foot  No swelling or signs of DVT.  Range of motion: 0 to  100  Patient with slight MCL laxity although does appear to have solid endpoint.  Stable to anterior and posterior drawer  Assessment/Plan  1.?Aftercare following left knee joint replacement surgery,?Aftercare following right knee joint replacement surgery  ?Patient doing failure after revision of bilateral knees. ?We will begin him on a graduated weightbearing?program. ?I discussed the patient is somewhat difficult given the bilateral nature of his injuries. ?Plan to see him back in about a month. ?At that point should be bearing full weight. ?He should continue wearing his brace on his left knee when?ambulating given his slight MCL laxity.  Ordered:  Clinic Follow up, *Est. 04/24/18 9:00:00 CDT, Order for future visit, Aftercare following left knee joint replacement surgery, [facility]  Post-Op follow-up visit [id number] PC, Aftercare following left knee joint replacement surgery, [facility], 03/29/18 11:04:00 CDT  PT/OT External Referral, 03/29/18 11:04:00 CDT, Aftercare following left knee joint replacement surgery, Evaluate and Treat, 25% Wt Bearing week 1, 50% WB 2nd week, 75% WB 3rd week, then 100% Wt Bearing, 3 X Week, Patient has IV, Standard Precautions, ****Wt Bearing Generic P...  ?   Problem List/Past Medical History  Ongoing  Able to lie down  Acid reflux  Acute disease or injury-related malnutrition  Arthritis  Closed fracture of medial plateau of left tibia  Exercise tolerance  Failed total left knee replacement  Hyperlipidemia  Hypertension  Obesity  Steroid  Historical  Contact lens  Gastric ulcer  Procedure/Surgical History  ORIF Tibia Plateau (Left) (02/28/2018), Removal of Synthetic Substitute from Left Knee Joint, Open Approach (02/28/2018), Replacement of Left Knee Joint with Synthetic Substitute, Cemented, Open Approach (02/28/2018), Reposition Left Tibia with Internal Fixation Device, Open Approach (02/28/2018), Total Knee Revision (Left) (02/28/2018), ORIF Tibia Plateau  (Right) (02/26/2018), Removal of Synthetic Substitute from Right Knee Joint, Open Approach (02/26/2018), Replacement of Right Knee Joint with Synthetic Substitute, Cemented, Open Approach (02/26/2018), Reposition Right Tibia with Internal Fixation Device, Open Approach (02/26/2018), Total Knee Revision (Right) (02/26/2018), STEPHANIE CHAPMAN Partial Knee Arthroplasty (Bilateral) (01/10/2018), Replacement of Left Knee Joint with Unicondylar Synthetic Substitute, Cemented, Open Approach (01/10/2018), Replacement of Right Knee Joint with Unicondylar Synthetic Substitute, Cemented, Open Approach (01/10/2018), Robotic Assisted Procedure of Lower Extremity, Open Approach (01/10/2018), Repair Right Anterior Chamber, Percutaneous Approach (01/22/2016), Trabeculoplasty by laser surgery (01/22/2016), Exploration of tendon sheath of hand (08/26/2015), Release Trigger Finger Or Thumb (Right) (08/26/2015), Tendon sheath incision (eg, for trigger finger) (08/26/2015), Achilles tendon repair, Colonoscopy.  Medications  acetaminophen-hydrocodone 325 mg-5 mg oral tablet, 1 tab(s), Oral, q4hr, PRN  acetaminophen-hydrocodone 325 mg-5 mg oral tablet, 2 tab(s), Oral, q4hr, PRN  ATORVASTATIN 10 MG TABLET, 10 mg= 1 tab(s), Oral, At Bedtime  Colace 100 mg oral capsule, 100 mg= 1 cap(s), Oral, Daily  LISINOP/HCTZ TAB 10-12.5, 1 tab(s), Oral, Daily  Lovenox 40 mg/0.4 mL subcutaneous solution, 40 mg= 0.4 mL, Subcutaneous, Daily  Neurontin 300 mg oral capsule, 300 mg= 1 cap(s), Oral, BID  Senokot S 50 mg-8.6 mg oral tablet, 2 tab(s), Oral, Once a day (at bedtime)  Valium 5 mg oral tablet, 5 mg= 1 tab(s), Oral, q6hr, PRN  Allergies  No Known Medication Allergies  Social History  Alcohol  Past, 08/22/2015  Employment/School  Highest education level: Post graduate degree(s)., 08/22/2015  Home/Environment  Lives with Alone. Living situation: Home/Independent. Family/Friends available for support: Yes., 08/22/2015  Nutrition/Health  Regular,  08/22/2015  Substance Abuse - Denies Substance Abuse, 08/22/2015  Tobacco - Denies Tobacco Use, 08/22/2015  Never smoker, 12/28/2017  Family History  Glaucoma.: Mother.  Heart disease 07-JUN-2017 17:48:24<$>: Mother.  Rheumatoid arthritis: Mother.  Diagnostic Results  AP lateral bilateral knees taken and reviewed. ?Patient well fixed components no signs of loosening or subsidence.? Fracture is well aligned and healing.

## 2022-05-04 NOTE — HISTORICAL [facility]
This is a historical note converted from Darrel. Formatting and pictures may have been removed.  Please reference Darrel for original formatting and attached multimedia. Chief Complaint  s/p bilateral knees are bad in the mornings. very stiff in the morning.  History of Present Illness  67-year-old who presents today for follow-up of bilateral knee?revisions with ORIF of his fractures. ?Patient?is?doing quite well. ?Is able with no assistive devices. ?Does note some morning stiffness although this does appear to be improving. ?Continues work with physical therapy making steady gains. ?Is happy with his progress thus far.  Review of Systems  Denies fevers, chills, chest pain, shortness of breath. Comprehensive review of systems performed and otherwise negative except as noted in HPI.  Physical Exam  Vitals & Measurements  BP:?129/84?  HT:?188?cm? HT:?188?cm? WT:?109.76?kg? WT:?109.76?kg? BMI:?31.05?  General: No acute distress, alert and oriented, healthy appearing?  HEENT: Head is atraumatic, mucous membranes are moist  Neck: Supples, no JVD  Cardiovascular: Palpable dorsalis pedis and posterior tibial pulses, regular rate and rhythm to those pulses  Lungs: Breathing non-labored  Skin: no rashes appreciated  Neurologic: Can flex and extend knees, ankles, and toes. Sensation is grossly intact  Examination knee:  Incision clean dry and intact. No erythema or drainage or signs of infection.  Sensation intact distally to left foot  Positive FHL/EHL/gastrocsoleus/tib ant  Brisk capillary refill to left foot  No swelling or signs of DVT.  Range of motion: 0 to 125  Stable to varus valgus?on the left knee.? Slight laxity to MCL on the right.  Stable to anterior and posterior drawer  Assessment/Plan  1.?Aftercare following left knee joint replacement surgery,?Aftercare following right knee joint replacement surgery  ?57-year-old male status post?bilateral knee arthroplasty with ORIF of bilateral knees.? He is doing quite  well. ?Happy with his progress. ?We will see him back in 3 months to check on his progress.  The patient that he should expect a somewhat prolonged recovery given?the bilateral nature of his injuries as well as?revisions.  Ordered:  Clinic Follow up, *Est. 08/28/18 8:30:00 CDT, Order for future visit, Aftercare following left knee joint replacement surgery, [facility]  Office/Outpatient Visit Level 3 Established [id number] , Aftercare following left knee joint replacement surgery, [facility], 06/26/18 8:40:00 CDT  ?   Problem List/Past Medical History  Ongoing  Able to lie down  Acid reflux  Acute disease or injury-related malnutrition  Arthritis  Closed fracture of medial plateau of left tibia  Exercise tolerance  Failed total left knee replacement  Hyperlipidemia  Hypertension  Obesity  Steroid  Historical  Contact lens  Gastric ulcer  Procedure/Surgical History  ORIF Tibia Plateau (Left) (02/28/2018), Removal of Synthetic Substitute from Left Knee Joint, Open Approach (02/28/2018), Replacement of Left Knee Joint with Synthetic Substitute, Cemented, Open Approach (02/28/2018), Reposition Left Tibia with Internal Fixation Device, Open Approach (02/28/2018), Total Knee Revision (Left) (02/28/2018), ORIF Tibia Plateau (Right) (02/26/2018), Removal of Synthetic Substitute from Right Knee Joint, Open Approach (02/26/2018), Replacement of Right Knee Joint with Synthetic Substitute, Cemented, Open Approach (02/26/2018), Reposition Right Tibia with Internal Fixation Device, Open Approach (02/26/2018), Total Knee Revision (Right) (02/26/2018), STEPHANIE ARI Partial Knee Arthroplasty (Bilateral) (01/10/2018), Replacement of Left Knee Joint with Unicondylar Synthetic Substitute, Cemented, Open Approach (01/10/2018), Replacement of Right Knee Joint with Unicondylar Synthetic Substitute, Cemented, Open Approach (01/10/2018), Robotic Assisted Procedure of Lower Extremity, Open Approach (01/10/2018), Repair Right  Anterior Chamber, Percutaneous Approach (01/22/2016), Trabeculoplasty by laser surgery (01/22/2016), Exploration of tendon sheath of hand (08/26/2015), Release Trigger Finger Or Thumb (Right) (08/26/2015), Tendon sheath incision (eg, for trigger finger) (08/26/2015), Achilles tendon repair, Colonoscopy.  Medications  acetaminophen-hydrocodone 325 mg-5 mg oral tablet, 1 tab(s), Oral, q4hr, PRN,? ?Not taking  acetaminophen-hydrocodone 325 mg-5 mg oral tablet, 2 tab(s), Oral, q4hr, PRN,? ?Not taking  ATORVASTATIN 10 MG TABLET, 10 mg= 1 tab(s), Oral, At Bedtime,? ?Not taking  Colace 100 mg oral capsule, 100 mg= 1 cap(s), Oral, Daily,? ?Not taking  LISINOP/HCTZ TAB 10-12.5, 1 tab(s), Oral, Daily  Lovenox 40 mg/0.4 mL subcutaneous solution, 40 mg= 0.4 mL, Subcutaneous, Daily,? ?Not taking  Neurontin 300 mg oral capsule, 300 mg= 1 cap(s), Oral, BID,? ?Not taking  Senokot S 50 mg-8.6 mg oral tablet, 2 tab(s), Oral, Once a day (at bedtime),? ?Not taking  Valium 5 mg oral tablet, 5 mg= 1 tab(s), Oral, q6hr, PRN,? ?Not taking  Allergies  No Known Medication Allergies  Social History  Alcohol  Past, 08/22/2015  Employment/School  Highest education level: Post graduate degree(s)., 08/22/2015  Home/Environment  Lives with Alone. Living situation: Home/Independent. Family/Friends available for support: Yes., 08/22/2015  Nutrition/Health  Regular, 08/22/2015  Substance Abuse - Denies Substance Abuse, 08/22/2015  Tobacco - Denies Tobacco Use, 08/22/2015  Never smoker, 12/28/2017  Family History  Glaucoma.: Mother.  Heart disease 07-JUN-2017 17:48:24<$>: Mother.  Rheumatoid arthritis: Mother.  Health Maintenance  Health Maintenance  ???Pending?(in the next year)  ??? ??Due?  ??? ? ? ?Abdominal Aortic Aneurysm Screening due??06/26/18??and every 1??year(s)  ??? ? ? ?Alcohol Misuse Screening due??06/26/18??and every 1??year(s)  ??? ? ? ?Aspirin Therapy for CVD Prevention due??06/26/18??and every 1??year(s)  ??? ? ? ?Colorectal Screening  due??06/26/18??Variable frequency  ??? ? ? ?Fall Risk Assessment due??06/26/18??and every 1??year(s)  ??? ? ? ?Functional Assessment due??06/26/18??and every 1??year(s)  ??? ? ? ?Hypertension Management-Education due??06/26/18??and every 1??year(s)  ??? ? ? ?Pneumococcal Vaccine due??06/26/18??and every 100??year(s)  ??? ? ? ?Smoking Cessation due??06/26/18??and every 1??year(s)  ??? ? ? ?Tetanus Vaccine due??06/26/18??and every 10??year(s)  ??? ? ? ?Zoster Vaccine due??06/26/18??and every 100??year(s)  ??? ??Due In Future?  ??? ? ? ?Influenza Vaccine not due until??10/02/18??and every 1??year(s)  ??? ? ? ?Hypertension Management-Blood Pressure not due until??12/26/18??and every 6??month(s)  ??? ? ? ?Lipid Screening not due until??12/28/18??and every 1??year(s)  ??? ? ? ?Hypertension Management-BMP not due until??03/02/19??and every 1??year(s)  ???Satisfied?(in the past 1 year)  ??? ??Satisfied?  ??? ? ? ?Blood Pressure Screening on??06/26/18.??Satisfied by Kalyan, Clair  ??? ? ? ?Body Mass Index Check on??06/26/18.??Satisfied by Kalyan, Clair  ??? ? ? ?Depression Screening on??06/26/18.??Satisfied by Kalyan, Clair  ??? ? ? ?Diabetes Screening on??03/02/18.??Satisfied by Janessa Castañeda  ??? ? ? ?Hypertension Management-Blood Pressure on??06/26/18.??Satisfied by Kalyan, Clair  ??? ? ? ?Lipid Screening on??12/28/17.??Satisfied by Anderson HERMOSILLO, Marcos Saldaña  ??? ? ? ?Obesity Screening on??06/26/18.??Satisfied by Clair Biggs  ??? ? ? ?Tobacco Use Screening on??06/26/18.??Satisfied by Clair Biggs  ?  ?  Diagnostic Results  AP lateral bodies taken and reviewed. ?Patient well fixed components no signs of loosening or subsidence.

## 2022-05-04 NOTE — HISTORICAL [facility]
This is a historical note converted from Cergeraldo. Formatting and pictures may have been removed.  Please reference Cergeraldo for original formatting and attached multimedia. Chief Complaint  CHRONIC BILATERAL KNEE PAIN...CLV  History of Present Illness  This is a 67-year-old male that comes in with bilateral knee pain. ?He has had this knee pain for the last 6-9 months. ?He is an avid golf player.? He complains a lot of night pain and medial sided knee pain.? He has been treated previously with multiple injections that?helped but then wear off.? He has not had any other recent treatment.  Review of Systems  ????GENERAL: No fevers, chills, unexpected weight loss or gain  ????MUSCULOSKELETAL: Joint pain, extremity pain  Physical Exam  Vitals & Measurements  HT:?187?cm? HT:?187?cm? WT:?111?kg? WT:?111?kg? BMI:?31.74?  General: Well-developed, well-nourished.  Neuro: Alert and oriented x 3.  Psych: Normal mood and affect.  CV: Palpable radial pulses.  Resp: Smooth and unlabored.  Skin: No evidence of focal lesions or trauma.  Hem/Imm/Lymph: No evidence of lymphangitis or adenopathy.  Gait: No trendelenburg gait.  DTR: 2+, no hypo or hyperreflexia.  Coordination and Balance: No tremors or abnormal station.  Bilateral?knee Exam:  Varus deformity. Range of motion from  degrees. Negative patella grind and equal subluxation of knee cap medial and lateral < 1cm. Negative patella tendon tenderness. Negative Lachman and anterior drawer test. Negative posterior drawer test. Negative varus and valgus stress test. Negative medial joint line tenderness. Negative lateral joint line tenderness. 5/5 strength and normal skin appearance. Sensibility normal.  Assessment/Plan  1.?Localized osteoarthritis of both knees  We discussed nonsurgical and surgical options. ?We will proceed with?bilateral unloading knee braces and bilateral status.? If this patient fails this, I think the next?option will be?Owen or?signature partial  knees.  Ordered:  Office/Outpatient Visit Level 3 New [id number] PC, Localized osteoarthritis of both knees  Obesity, [facility] - [facility] , 07/18/17 19:26:00 CDT  ?  2.?Obesity  Ordered:  Office/Outpatient Visit Level 3 New [id number] PC, Localized osteoarthritis of both knees  Obesity, [facility] - Ortho Surg , 07/18/17 19:26:00 CDT  ?   Problem List/Past Medical History  Able to lie down  Exercise tolerance  Hyperlipidemia  Hypertension  Obesity  Steroid  Historical  Contact lens  Gastric ulcer  Procedure/Surgical History  Repair Right Anterior Chamber, Percutaneous Approach (01/22/2016), Trabeculoplasty by laser surgery (01/22/2016), Exploration of tendon sheath of hand (08/26/2015), Release Trigger Finger Or Thumb (Right) (08/26/2015), Tendon sheath incision (eg, for trigger finger) (08/26/2015), Achilles tendon repair, Colonoscopy.  Medications  Lipitor 10 mg oral tablet, 10 mg, 1 tab(s), Oral, Daily  Norvasc 5 mg oral tablet, 5 mg, 1 tab(s), Oral, Daily  Allergies  No Known Medication Allergies  Social History  Alcohol  Past  Employment/School  Highest education level: Post graduate degree(s).  Home/Environment  Lives with Alone. Living situation: Home/Independent. Family/Friends available for support: Yes.  Nutrition/Health  Regular  Substance Abuse - Denies Substance Abuse  Tobacco - Denies Tobacco Use  Diagnostic Results  X-rays demonstrate views of the knee joint space narrowing and degenerative changes with a varus deformity. There is also subchondral sclerosis and osteophyte formation.

## 2022-05-04 NOTE — HISTORICAL [facility]
This is a historical note converted from Darrel. Formatting and pictures may have been removed.  Please reference Darrel for original formatting and attached multimedia. Chief Complaint  1 YEAR F/U REVISION BILATERAL TKA ON 2/28/18. RIGHT HTUMB PAIN WITH CLICKING. ?NO INJURY.  History of Present Illness  69-year-old man?presents today for follow-up of bilateral knee revision. ?Patient had revisions of both knees for periprosthetic fracture. ?He is overall doing fairly well with his knees.? Does have some pain at times although no significant?instability or other issues.? Mainly complaining of?right hand catching and locking to his thumb.  Review of Systems  Denies fevers, chills, chest pain, shortness of breath. Comprehensive review of systems performed and otherwise negative except as noted in HPI.  Physical Exam  Vitals & Measurements  T:?98.5? ?F (Oral)? BP:?133/95?  HT:?188?cm? WT:?124?kg? BMI:?35.08?  General: No acute distress, alert and oriented, healthy appearing?  HEENT: Head is atraumatic, mucous membranes are moist  Cardiovascular: Brisk capillary refill  Lungs: Breathing non-labored  Skin: no rashes appreciated  Neurologic: Sensation is grossly intact distally  ?  Right hand:  Patient with brisk cap refill disappeared stage intact distally.? Significant point tenderness over his A1 pulley?with?popping and catching/triggering.  ?  Bilateral knees:  Patient with intact sensation of both of his knees.? He has excellent range of motion from 0-120 to both knees.? No significant crepitus. ?Excellent patella tracking. ?Does have very slight MCL laxity with a solid endpoint?to his left knee. ?Right knee is stable on exam.? No anterior posterior instability.  Assessment/Plan  1.?Aftercare following right knee joint replacement surgery?Z47.1  ?Patient status post revision of both of his knees. ?Overall doing fairly well. ?His knees appear stable with no significant signs of loosening or subsidence noted.? We will  continue to monitor these knees on a yearly basis.  Ordered:  Office/Outpatient Visit Level 3 Established [id number] PC, Pain of right thumb  Aftercare following right knee joint replacement surgery  Trigger thumb of right hand, [facility], 02/20/20 10:00:00 CST  ?  2.?Trigger thumb of right hand?M65.311  ?Patient with trigger thumb to the right hand. ?We discussed all treatment options we will try an injection to calm this down.  ?  After verbal exam was obtained the patients right hand was prepped over the A1 pulley of his right thumb.? 1 cc of 2% lidocaine and 6 mg betamethasone was then injected into his A1 pulley.  Ordered:  Injections Tendon Sheath/lig [id number] PC, 02/20/20 10:00:00 CST, [facility], Routine, 02/20/20 10:00:00 CST, Trigger thumb of right hand  Pain of right thumb  Office/Outpatient Visit Level 3 Established [id number] PC, Pain of right thumb  Aftercare following right knee joint replacement surgery  Trigger thumb of right hand, [facility], 02/20/20 10:00:00 CST  ?  Orders:  betamethasone, 6 mg, Intra-Articular, Once, first dose 02/20/20 10:00:00 CST, stop date 02/20/20 10:00:00 CST  Lidocaine inj., 1 mL, Intra-Articular, Once, first dose 02/20/20 10:00:00 CST, stop date 02/20/20 10:00:00 CST  XR Hand Thumb Right Min 2 Views, Routine, 02/20/20 9:26:00 CST, Pain, None, Stretcher, Patient Has IV?, Rad Type, Pain of right thumb, Not Scheduled, 02/20/20 9:26:00 CST   Problem List/Past Medical History  Ongoing  Able to lie down  Acid reflux  Acute disease or injury-related malnutrition  Arthritis  Closed fracture of medial plateau of left tibia  Exercise tolerance  Failed total left knee replacement  Hyperlipidemia  Hypertension  Obesity  Steroid  Historical  Contact lens  Gastric ulcer  Procedure/Surgical History  ORIF Tibia Plateau (Left) (02/28/2018)  Removal of Synthetic Substitute from Left Knee Joint, Open Approach (02/28/2018)  Replacement of Left Knee Joint with  Synthetic Substitute, Cemented, Open Approach (02/28/2018)  Reposition Left Tibia with Internal Fixation Device, Open Approach (02/28/2018)  Total Knee Revision (Left) (02/28/2018)  ORIF Tibia Plateau (Right) (02/26/2018)  Removal of Synthetic Substitute from Right Knee Joint, Open Approach (02/26/2018)  Replacement of Right Knee Joint with Synthetic Substitute, Cemented, Open Approach (02/26/2018)  Reposition Right Tibia with Internal Fixation Device, Open Approach (02/26/2018)  Total Knee Revision (Right) (02/26/2018)  STEPHANIE CHAPMAN Partial Knee Arthroplasty (Bilateral) (01/10/2018)  Replacement of Left Knee Joint with Unicondylar Synthetic Substitute, Cemented, Open Approach (01/10/2018)  Replacement of Right Knee Joint with Unicondylar Synthetic Substitute, Cemented, Open Approach (01/10/2018)  Robotic Assisted Procedure of Lower Extremity, Open Approach (01/10/2018)  Repair Right Anterior Chamber, Percutaneous Approach (01/22/2016)  Trabeculoplasty by laser surgery (01/22/2016)  Exploration of tendon sheath of hand (08/26/2015)  Release Trigger Finger Or Thumb (Right) (08/26/2015)  Tendon sheath incision (eg, for trigger finger) (08/26/2015)  Achilles tendon repair  Colonoscopy   Medications  acetaminophen-hydrocodone 325 mg-5 mg oral tablet, 1 tab(s), Oral, q4hr, PRN,? ?Not taking  acetaminophen-hydrocodone 325 mg-5 mg oral tablet, 2 tab(s), Oral, q4hr, PRN,? ?Not taking  ATORVASTATIN 10 MG TABLET, 10 mg= 1 tab(s), Oral, At Bedtime  Celestone, 6 mg, Intra-Articular, Once  Colace 100 mg oral capsule, 100 mg= 1 cap(s), Oral, Daily,? ?Not taking  lidocaine 2% injectable solution, 1 mL, Intra-Articular, Once  LISINOP/HCTZ TAB 10-12.5, 1 tab(s), Oral, Daily  Lovenox 40 mg/0.4 mL subcutaneous solution, 40 mg= 0.4 mL, Subcutaneous, Daily,? ?Not taking  Neurontin 300 mg oral capsule, 300 mg= 1 cap(s), Oral, BID  Senokot S 50 mg-8.6 mg oral tablet, 2 tab(s), Oral, Once a day (at bedtime),? ?Not taking  Valium 5 mg oral  tablet, 5 mg= 1 tab(s), Oral, q6hr, PRN,? ?Not taking  Allergies  No Known Medication Allergies  Social History  Abuse/Neglect  No, No, Yes, 02/20/2020  Alcohol  Past, 08/22/2015  Employment/School  Highest education level: Post graduate degree(s)., 08/22/2015  Home/Environment  Lives with Alone. Living situation: Home/Independent. Family/Friends available for support: Yes., 08/22/2015  Nutrition/Health  Regular, 08/22/2015  Substance Use - Denies Substance Abuse, 08/22/2015  Tobacco - Denies Tobacco Use, 08/22/2015  Never (less than 100 in lifetime), N/A, 02/20/2020  Family History  Glaucoma.: Mother.  Heart disease.....: Mother.  Rheumatoid arthritis: Mother.  Health Maintenance  Health Maintenance  ???Pending?(in the next year)  ??? ??OverDue  ??? ? ? ?Advance Directive due??01/01/20??and every 1??year(s)  ??? ? ? ?Geriatric Depression Screening due??01/01/20??and every 1??year(s)  ??? ? ? ?Hypertension Management-Blood Pressure due??02/19/20??and every 1??year(s)  ??? ??Due?  ??? ? ? ?Alcohol Misuse Screening due??01/01/20??and every 1??year(s)  ??? ? ? ?Cognitive Screening due??01/01/20??and every 1??year(s)  ??? ? ? ?Fall Risk Assessment due??01/01/20??and every 1??year(s)  ??? ? ? ?Functional Assessment due??01/01/20??and every 1??year(s)  ??? ? ? ?ADL Screening due??02/20/20??and every 1??year(s)  ??? ? ? ?Aspirin Therapy for CVD Prevention due??02/20/20??and every 1??year(s)  ??? ? ? ?Colorectal Screening (Senior Wellness) due??02/20/20??and every 10??year(s)  ??? ? ? ?Hypertension Management-Education due??02/20/20??and every 1??year(s)  ??? ? ? ?Medicare Annual Wellness Exam due??02/20/20??and every 1??year(s)  ??? ? ? ?Pneumococcal Vaccine due??02/20/20??Variable frequency  ??? ? ? ?Pneumococcal Vaccine due??02/20/20??and every?  ??? ? ? ?Tetanus Vaccine due??02/20/20??and every 10??year(s)  ??? ? ? ?Zoster Vaccine due??02/20/20??and every 100??year(s)  ??? ??Due In Future?  ??? ? ? ?Hypertension  Management-BMP not due until??11/12/20??and every 1??year(s)  ??? ? ? ?Obesity Screening not due until??01/01/21??and every 1??year(s)  ???Satisfied?(in the past 1 year)  ??? ??Satisfied?  ??? ? ? ?Blood Pressure Screening on??02/20/20.??Satisfied by Clair Biggs  ??? ? ? ?Body Mass Index Check on??02/20/20.??Satisfied by Clair Biggs  ??? ? ? ?Diabetes Screening on??11/13/19.??Satisfied by Evelyn Lamb  ??? ? ? ?Hypertension Management-Blood Pressure on??02/20/20.??Satisfied by Clair Biggs  ??? ? ? ?Lipid Screening on??11/13/19.??Satisfied by Evelyn Lamb  ??? ? ? ?Obesity Screening on??02/20/20.??Satisfied by Clair Biggs  ?  Diagnostic Results  AP lateral biopsies reviewed. ?Patients implants appear well fixed with no significant signs of loosening.  AP lateral right hand reviewed. ?Patient with well-maintained joint space. ?No fractures dislocations subluxations seen.

## 2022-05-04 NOTE — HISTORICAL [facility] CERNER
This is a historical note converted from Darrel. Formatting and pictures may have been removed.  Please reference Darrel for original formatting and attached multimedia. Chief Complaint  BILATERAL KNEE PAIN HE IS A REFERRAL FROM DR. GUALLPA. SX 1/10/18 GLOBAL 4/10/18. HARDWARE IS BROKEN IN BOTH KNEES. HE IS TILL IN SOME PAIN.  History of Present Illness  67-year-old male presents today for evaluation of bilateral knees. ?Patient has a history of uniform knee arthroplasties of both knees about 6 weeks ago. ?He was doing quite well?however?he states he was standing up cooking for some extended period of time and had began having worsening pain to both his knees.? He has significant swelling following that. ?He was seen by his?primary surgeon?2 days ago and noted to have?periprosthetic fractures to both sides.? He was referred to me for for further treatment. ?Otherwise he has isolated complaints of pain to bilateral knees. ?Denies falls. ?He denies any injury. ?Denies any?motor vehicle collision or other cause.  Review of Systems  Denies fevers, chills, chest pain, shortness of breath. Comprehensive review of systems performed and otherwise negative except as noted in HPI.  Physical Exam  Vitals & Measurements  BP:?156/77?  HT:?188?cm? HT:?188?cm? WT:?109.76?kg? WT:?109.76?kg? BMI:?31.05?  General: No acute distress, alert and oriented, healthy appearing?  HEENT: Head is atraumatic, mucous membranes are moist  Neck: Supples, no JVD  Cardiovascular: Palpable dorsalis pedis and posterior tibial pulses, regular rate and rhythm to those pulses  Lungs: Breathing non-labored  Skin: no rashes appreciated  Neurologic: Can flex and extend knees, ankles, and toes. Sensation is grossly intact  ?  Bilateral knees:  Full range of motion not checked the bilateral knees. ?Does have significant swelling with a 2-3+ effusion to both sides. ?His incisions are otherwise well-healed without erythema drainage or signs of any active  infection.? Stability also not checked giving medial tibial plateau fracture.  Assessment/Plan  1.?Fracture of right tibial plateau  ?Patient with bilateral periprosthetic tibial plateau fractures. ?There is significant subsidence of the implant. ?We discussed all treatment options today?with the patient. ?At minimum he would need operative fixation of these?tibial plateau fractures given the displacement as well as the propensity?for further subsidence. ?He does have a unicompartmental knee arthroplasty on both sides, given?this implant, to be fairly difficult to get adequate alignment of this and he will likely need further revision in the future. ?We discussed this today as well as the possibility of performing a revision at the time of the operative fixation.? Patient would like to only undergo one procedure if at all possible?and only one healing therefore he would like to perform both an ORIF as well as a revision?at the same time under the same anesthetic if possible. ?The risks, benefits, and alternatives to ORIF of the tibial plateau and revision of bilateral?unicompartmental arthroplasty to total knee arthroplasties were discussed in detail with the patient today. ?All questions were answered. ?No guarantees made. ?Despite these risks he like to proceed with surgical prevention.? We will plan to taken to surgery?on Monday, 2/26/2018.  Ordered:  Post-Op follow-up visit [id number] PC, Fracture of right tibial plateau  Fracture of left tibial plateau  Periprosthetic fracture around internal prosthetic left knee joint  Periprosthetic fracture around internal prosthetic right knee joint, [facility], 02/22/18 11:43:00 CST  ?  2.?Fracture of left tibial plateau  Ordered:  Post-Op follow-up visit [id number] PC, Fracture of right tibial plateau  Fracture of left tibial plateau  Periprosthetic fracture around internal prosthetic left knee joint  Periprosthetic fracture around internal prosthetic right knee  joint, [facility], 02/22/18 11:43:00 CST  ?  3.?Periprosthetic fracture around internal prosthetic left knee joint  Ordered:  Post-Op follow-up visit [id number] PC, Fracture of right tibial plateau  Fracture of left tibial plateau  Periprosthetic fracture around internal prosthetic left knee joint  Periprosthetic fracture around internal prosthetic right knee joint, [facility], 02/22/18 11:43:00 CST  ?  4.?Periprosthetic fracture around internal prosthetic right knee joint  Ordered:  Post-Op follow-up visit [id number] PC, Fracture of right tibial plateau  Fracture of left tibial plateau  Periprosthetic fracture around internal prosthetic left knee joint  Periprosthetic fracture around internal prosthetic right knee joint, [facility], 02/22/18 11:43:00 CST  ?  Knee pain  ?   Problem List/Past Medical History  Ongoing  Able to lie down  Acid reflux  Arthritis  Exercise tolerance  Hyperlipidemia  Hypertension  Obesity  Steroid  Historical  Contact lens  Gastric ulcer  Procedure/Surgical History  [facility] Partial Knee Arthroplasty (Bilateral) (01/10/2018), Replacement of Left Knee Joint with Unicondylar Synthetic Substitute, Cemented, Open Approach (01/10/2018), Replacement of Right Knee Joint with Unicondylar Synthetic Substitute, Cemented, Open Approach (01/10/2018), Robotic Assisted Procedure of Lower Extremity, Open Approach (01/10/2018), Repair Right Anterior Chamber, Percutaneous Approach (01/22/2016), Trabeculoplasty by laser surgery (01/22/2016), Exploration of tendon sheath of hand (08/26/2015), Release Trigger Finger Or Thumb (Right) (08/26/2015), Tendon sheath incision (eg, for trigger finger) (08/26/2015), Achilles tendon repair, Colonoscopy.  Medications  ATORVASTATIN 10 MG TABLET, 10 mg= 1 tab(s), Oral, At Bedtime  LISINOP/HCTZ TAB 10-12.5, 1 tab(s), Oral, Daily  Norco 5 mg-325 mg oral tablet, 1 tab(s), Oral, q6hr  Norco 7.5 mg-325 mg oral tablet, 1 tab(s), Oral, q4hr,  PRN  Phenergan 12.5 mg Tab, 12.5 mg= 1 tab(s), Oral, q6hr, PRN  Allergies  No Known Medication Allergies  Social History  Alcohol  Past, 08/22/2015  Employment/School  Highest education level: Post graduate degree(s)., 08/22/2015  Home/Environment  Lives with Alone. Living situation: Home/Independent. Family/Friends available for support: Yes., 08/22/2015  Nutrition/Health  Regular, 08/22/2015  Substance Abuse - Denies Substance Abuse, 08/22/2015  Tobacco - Denies Tobacco Use, 08/22/2015  Never smoker, 12/28/2017  Family History  Glaucoma.: Mother.  Heart disease 07-JUN-2017 17:48:24<$>: Mother.  Rheumatoid arthritis: Mother.  Diagnostic Results  AP of bilateral knees and tibias were reviewed today.??Periprosthetic tibial plateau fracture again seen today. ?Patients previously noted?patient has good distal bone stock.

## 2023-10-05 ENCOUNTER — LAB VISIT (OUTPATIENT)
Dept: LAB | Facility: HOSPITAL | Age: 73
End: 2023-10-05
Attending: FAMILY MEDICINE
Payer: MEDICARE

## 2023-10-05 DIAGNOSIS — R31.9 HEMATURIA SYNDROME: ICD-10-CM

## 2023-10-05 DIAGNOSIS — N40.0 BENIGN PROSTATIC HYPERPLASIA, UNSPECIFIED WHETHER LOWER URINARY TRACT SYMPTOMS PRESENT: ICD-10-CM

## 2023-10-05 DIAGNOSIS — L91.8 FIBROEPITHELIAL POLYP: ICD-10-CM

## 2023-10-05 DIAGNOSIS — F45.8 ANXIETY HYPERVENTILATION: ICD-10-CM

## 2023-10-05 DIAGNOSIS — E78.5 HYPERLIPIDEMIA, UNSPECIFIED HYPERLIPIDEMIA TYPE: ICD-10-CM

## 2023-10-05 DIAGNOSIS — J30.9 SPASMODIC RHINORRHEA: Primary | ICD-10-CM

## 2023-10-05 DIAGNOSIS — M25.569 ARTHRALGIA OF LOWER LEG, UNSPECIFIED LATERALITY: ICD-10-CM

## 2023-10-05 DIAGNOSIS — F41.9 ANXIETY HYPERVENTILATION: ICD-10-CM

## 2023-10-05 DIAGNOSIS — Z12.5 SPECIAL SCREENING FOR MALIGNANT NEOPLASM OF PROSTATE: ICD-10-CM

## 2023-10-05 DIAGNOSIS — G47.00 PERSISTENT DISORDER OF INITIATING OR MAINTAINING SLEEP: ICD-10-CM

## 2023-10-05 DIAGNOSIS — L98.9 FIBROHISTIOCYTIC PROLIFERATION OF THE SKIN: ICD-10-CM

## 2023-10-05 DIAGNOSIS — I10 ESSENTIAL HYPERTENSION, MALIGNANT: ICD-10-CM

## 2023-10-05 DIAGNOSIS — R37 SEXUAL DYSFUNCTION: ICD-10-CM

## 2023-10-05 LAB
ALBUMIN SERPL-MCNC: 4.2 G/DL (ref 3.4–4.8)
ALBUMIN/GLOB SERPL: 1.2 RATIO (ref 1.1–2)
ALP SERPL-CCNC: 36 UNIT/L (ref 40–150)
ALT SERPL-CCNC: 30 UNIT/L (ref 0–55)
APPEARANCE UR: CLEAR
AST SERPL-CCNC: 22 UNIT/L (ref 5–34)
BACTERIA #/AREA URNS AUTO: NORMAL /HPF
BILIRUB SERPL-MCNC: 0.4 MG/DL
BILIRUB UR QL STRIP.AUTO: NEGATIVE
BUN SERPL-MCNC: 17.1 MG/DL (ref 8.4–25.7)
CALCIUM SERPL-MCNC: 9.7 MG/DL (ref 8.8–10)
CHLORIDE SERPL-SCNC: 104 MMOL/L (ref 98–107)
CHOLEST SERPL-MCNC: 167 MG/DL
CHOLEST/HDLC SERPL: 4 {RATIO} (ref 0–5)
CO2 SERPL-SCNC: 28 MMOL/L (ref 23–31)
COLOR UR AUTO: YELLOW
CREAT SERPL-MCNC: 1.37 MG/DL (ref 0.73–1.18)
ERYTHROCYTE [DISTWIDTH] IN BLOOD BY AUTOMATED COUNT: 12.7 % (ref 11.5–17)
GFR SERPLBLD CREATININE-BSD FMLA CKD-EPI: 55 MLS/MIN/1.73/M2
GLOBULIN SER-MCNC: 3.5 GM/DL (ref 2.4–3.5)
GLUCOSE SERPL-MCNC: 190 MG/DL (ref 82–115)
GLUCOSE UR QL STRIP.AUTO: 250
HCT VFR BLD AUTO: 43 % (ref 42–52)
HDLC SERPL-MCNC: 39 MG/DL (ref 35–60)
HGB BLD-MCNC: 13.8 G/DL (ref 14–18)
KETONES UR QL STRIP.AUTO: NEGATIVE
LDLC SERPL CALC-MCNC: 98 MG/DL (ref 50–140)
LEUKOCYTE ESTERASE UR QL STRIP.AUTO: NEGATIVE
MCH RBC QN AUTO: 31.9 PG (ref 27–31)
MCHC RBC AUTO-ENTMCNC: 32.1 G/DL (ref 33–36)
MCV RBC AUTO: 99.3 FL (ref 80–94)
NITRITE UR QL STRIP.AUTO: NEGATIVE
PH UR STRIP.AUTO: 5 [PH]
PLATELET # BLD AUTO: 197 X10(3)/MCL (ref 130–400)
PMV BLD AUTO: 10.6 FL (ref 7.4–10.4)
POTASSIUM SERPL-SCNC: 4.1 MMOL/L (ref 3.5–5.1)
PROT SERPL-MCNC: 7.7 GM/DL (ref 5.8–7.6)
PROT UR QL STRIP.AUTO: NEGATIVE
PSA SERPL-MCNC: 2.81 NG/ML
RBC # BLD AUTO: 4.33 X10(6)/MCL (ref 4.7–6.1)
RBC #/AREA URNS AUTO: NORMAL /HPF
RBC UR QL AUTO: ABNORMAL
SODIUM SERPL-SCNC: 141 MMOL/L (ref 136–145)
SP GR UR STRIP.AUTO: >=1.03 (ref 1–1.03)
SQUAMOUS #/AREA URNS AUTO: NORMAL /HPF
TRIGL SERPL-MCNC: 152 MG/DL (ref 34–140)
TSH SERPL-ACNC: 2.19 UIU/ML (ref 0.35–4.94)
UROBILINOGEN UR STRIP-ACNC: 0.2
VLDLC SERPL CALC-MCNC: 30 MG/DL
WBC # SPEC AUTO: 5.48 X10(3)/MCL (ref 4.5–11.5)
WBC #/AREA URNS AUTO: NORMAL /HPF

## 2023-10-05 PROCEDURE — 80061 LIPID PANEL: CPT

## 2023-10-05 PROCEDURE — 80053 COMPREHEN METABOLIC PANEL: CPT

## 2023-10-05 PROCEDURE — 84443 ASSAY THYROID STIM HORMONE: CPT

## 2023-10-05 PROCEDURE — 85027 COMPLETE CBC AUTOMATED: CPT

## 2023-10-05 PROCEDURE — 36415 COLL VENOUS BLD VENIPUNCTURE: CPT

## 2023-10-05 PROCEDURE — 81001 URINALYSIS AUTO W/SCOPE: CPT

## 2023-10-05 PROCEDURE — 84153 ASSAY OF PSA TOTAL: CPT

## 2024-03-28 ENCOUNTER — LAB VISIT (OUTPATIENT)
Dept: LAB | Facility: HOSPITAL | Age: 74
End: 2024-03-28
Attending: FAMILY MEDICINE
Payer: MEDICARE

## 2024-03-28 DIAGNOSIS — F45.8 ANXIETY HYPERVENTILATION: ICD-10-CM

## 2024-03-28 DIAGNOSIS — F41.9 ANXIETY HYPERVENTILATION: ICD-10-CM

## 2024-03-28 DIAGNOSIS — E11.9 DIABETES MELLITUS WITHOUT COMPLICATION: ICD-10-CM

## 2024-03-28 DIAGNOSIS — M19.90 SENILE ARTHRITIS: ICD-10-CM

## 2024-03-28 DIAGNOSIS — N40.0 BENIGN PROSTATIC HYPERPLASIA, UNSPECIFIED WHETHER LOWER URINARY TRACT SYMPTOMS PRESENT: ICD-10-CM

## 2024-03-28 DIAGNOSIS — J30.9 SPASMODIC RHINORRHEA: Primary | ICD-10-CM

## 2024-03-28 LAB
ALBUMIN SERPL-MCNC: 4 G/DL (ref 3.4–4.8)
ALBUMIN/GLOB SERPL: 1.4 RATIO (ref 1.1–2)
ALP SERPL-CCNC: 40 UNIT/L (ref 40–150)
ALT SERPL-CCNC: 22 UNIT/L (ref 0–55)
AST SERPL-CCNC: 19 UNIT/L (ref 5–34)
BILIRUB SERPL-MCNC: 0.6 MG/DL
BUN SERPL-MCNC: 9.9 MG/DL (ref 8.4–25.7)
CALCIUM SERPL-MCNC: 9.5 MG/DL (ref 8.8–10)
CHLORIDE SERPL-SCNC: 107 MMOL/L (ref 98–107)
CO2 SERPL-SCNC: 26 MMOL/L (ref 23–31)
CREAT SERPL-MCNC: 1.02 MG/DL (ref 0.73–1.18)
EST. AVERAGE GLUCOSE BLD GHB EST-MCNC: 162.8 MG/DL
GFR SERPLBLD CREATININE-BSD FMLA CKD-EPI: >60 MLS/MIN/1.73/M2
GLOBULIN SER-MCNC: 2.8 GM/DL (ref 2.4–3.5)
GLUCOSE SERPL-MCNC: 178 MG/DL (ref 82–115)
HBA1C MFR BLD: 7.3 %
POTASSIUM SERPL-SCNC: 3.9 MMOL/L (ref 3.5–5.1)
PROT SERPL-MCNC: 6.8 GM/DL (ref 5.8–7.6)
SODIUM SERPL-SCNC: 142 MMOL/L (ref 136–145)

## 2024-03-28 PROCEDURE — 80053 COMPREHEN METABOLIC PANEL: CPT

## 2024-03-28 PROCEDURE — 36415 COLL VENOUS BLD VENIPUNCTURE: CPT

## 2024-03-28 PROCEDURE — 83036 HEMOGLOBIN GLYCOSYLATED A1C: CPT

## 2024-09-24 ENCOUNTER — LAB VISIT (OUTPATIENT)
Dept: LAB | Facility: HOSPITAL | Age: 74
End: 2024-09-24
Attending: FAMILY MEDICINE
Payer: MEDICARE

## 2024-09-24 DIAGNOSIS — M25.569 ARTHRALGIA OF LOWER LEG, UNSPECIFIED LATERALITY: ICD-10-CM

## 2024-09-24 DIAGNOSIS — E11.9 DIABETES MELLITUS WITHOUT COMPLICATION: ICD-10-CM

## 2024-09-24 DIAGNOSIS — I10 ESSENTIAL HYPERTENSION, MALIGNANT: ICD-10-CM

## 2024-09-24 DIAGNOSIS — G47.00 PERSISTENT DISORDER OF INITIATING OR MAINTAINING SLEEP: ICD-10-CM

## 2024-09-24 DIAGNOSIS — Z12.5 SPECIAL SCREENING FOR MALIGNANT NEOPLASM OF PROSTATE: ICD-10-CM

## 2024-09-24 DIAGNOSIS — F41.9 ANXIETY DISORDER OF CHILDHOOD OR ADOLESCENCE: ICD-10-CM

## 2024-09-24 DIAGNOSIS — E78.5 HYPERLIPIDEMIA, UNSPECIFIED HYPERLIPIDEMIA TYPE: ICD-10-CM

## 2024-09-24 DIAGNOSIS — J30.9 SPASMODIC RHINORRHEA: Primary | ICD-10-CM

## 2024-09-24 DIAGNOSIS — M19.90 SENILE ARTHRITIS: ICD-10-CM

## 2024-09-24 DIAGNOSIS — R31.9 HEMATURIA SYNDROME: ICD-10-CM

## 2024-09-24 DIAGNOSIS — L98.9 FIBROHISTIOCYTIC PROLIFERATION OF THE SKIN: ICD-10-CM

## 2024-09-24 DIAGNOSIS — K52.9 INFLAMMATORY BOWEL DISEASE: ICD-10-CM

## 2024-09-24 DIAGNOSIS — N40.0 BENIGN PROSTATIC HYPERPLASIA, UNSPECIFIED WHETHER LOWER URINARY TRACT SYMPTOMS PRESENT: ICD-10-CM

## 2024-09-24 LAB
ALBUMIN SERPL-MCNC: 4 G/DL (ref 3.4–4.8)
ALBUMIN/GLOB SERPL: 1.3 RATIO (ref 1.1–2)
ALP SERPL-CCNC: 43 UNIT/L (ref 40–150)
ALT SERPL-CCNC: 18 UNIT/L (ref 0–55)
ANION GAP SERPL CALC-SCNC: 11 MEQ/L
AST SERPL-CCNC: 15 UNIT/L (ref 5–34)
BACTERIA #/AREA URNS AUTO: NORMAL /HPF
BILIRUB SERPL-MCNC: 0.7 MG/DL
BILIRUB UR QL STRIP.AUTO: NEGATIVE
BUN SERPL-MCNC: 13.7 MG/DL (ref 8.4–25.7)
CALCIUM SERPL-MCNC: 9.9 MG/DL (ref 8.8–10)
CHLORIDE SERPL-SCNC: 106 MMOL/L (ref 98–107)
CHOLEST SERPL-MCNC: 167 MG/DL
CHOLEST/HDLC SERPL: 4 {RATIO} (ref 0–5)
CLARITY UR: CLEAR
CO2 SERPL-SCNC: 24 MMOL/L (ref 23–31)
COLOR UR AUTO: ABNORMAL
CREAT SERPL-MCNC: 0.99 MG/DL (ref 0.73–1.18)
CREAT/UREA NIT SERPL: 14
ERYTHROCYTE [DISTWIDTH] IN BLOOD BY AUTOMATED COUNT: 12.6 % (ref 11.5–17)
EST. AVERAGE GLUCOSE BLD GHB EST-MCNC: 148.5 MG/DL
GFR SERPLBLD CREATININE-BSD FMLA CKD-EPI: >60 ML/MIN/1.73/M2
GLOBULIN SER-MCNC: 3 GM/DL (ref 2.4–3.5)
GLUCOSE SERPL-MCNC: 117 MG/DL (ref 82–115)
GLUCOSE UR QL STRIP: >=1000
HBA1C MFR BLD: 6.8 %
HCT VFR BLD AUTO: 43.8 % (ref 42–52)
HDLC SERPL-MCNC: 40 MG/DL (ref 35–60)
HGB BLD-MCNC: 14.2 G/DL (ref 14–18)
HGB UR QL STRIP: ABNORMAL
KETONES UR QL STRIP: NEGATIVE
LDLC SERPL CALC-MCNC: 105 MG/DL (ref 50–140)
LEUKOCYTE ESTERASE UR QL STRIP: NEGATIVE
MCH RBC QN AUTO: 31.8 PG (ref 27–31)
MCHC RBC AUTO-ENTMCNC: 32.4 G/DL (ref 33–36)
MCV RBC AUTO: 98.2 FL (ref 80–94)
NITRITE UR QL STRIP: NEGATIVE
PH UR STRIP: 6 [PH]
PLATELET # BLD AUTO: 176 X10(3)/MCL (ref 130–400)
PMV BLD AUTO: 10.7 FL (ref 7.4–10.4)
POTASSIUM SERPL-SCNC: 4.2 MMOL/L (ref 3.5–5.1)
PROT SERPL-MCNC: 7 GM/DL (ref 5.8–7.6)
PROT UR QL STRIP: NEGATIVE
PSA SERPL-MCNC: 1.42 NG/ML
RBC # BLD AUTO: 4.46 X10(6)/MCL (ref 4.7–6.1)
RBC #/AREA URNS AUTO: NORMAL /HPF
SODIUM SERPL-SCNC: 141 MMOL/L (ref 136–145)
SP GR UR STRIP.AUTO: 1.02 (ref 1–1.03)
SQUAMOUS #/AREA URNS AUTO: NORMAL /HPF
TRIGL SERPL-MCNC: 112 MG/DL (ref 34–140)
TSH SERPL-ACNC: 1.64 UIU/ML (ref 0.35–4.94)
UROBILINOGEN UR STRIP-ACNC: 0.2
VLDLC SERPL CALC-MCNC: 22 MG/DL
WBC # BLD AUTO: 4.83 X10(3)/MCL (ref 4.5–11.5)
WBC #/AREA URNS AUTO: NORMAL /HPF

## 2024-09-24 PROCEDURE — 36415 COLL VENOUS BLD VENIPUNCTURE: CPT

## 2024-09-24 PROCEDURE — 85027 COMPLETE CBC AUTOMATED: CPT

## 2024-09-24 PROCEDURE — 80061 LIPID PANEL: CPT

## 2024-09-24 PROCEDURE — 80053 COMPREHEN METABOLIC PANEL: CPT

## 2024-09-24 PROCEDURE — 84443 ASSAY THYROID STIM HORMONE: CPT

## 2024-09-24 PROCEDURE — 84153 ASSAY OF PSA TOTAL: CPT

## 2024-09-24 PROCEDURE — 83036 HEMOGLOBIN GLYCOSYLATED A1C: CPT

## 2024-09-24 PROCEDURE — 81003 URINALYSIS AUTO W/O SCOPE: CPT

## 2025-02-25 ENCOUNTER — HOSPITAL ENCOUNTER (OUTPATIENT)
Dept: RADIOLOGY | Facility: CLINIC | Age: 75
Discharge: HOME | End: 2025-02-25
Attending: ORTHOPAEDIC SURGERY
Payer: MEDICARE

## 2025-02-25 ENCOUNTER — OFFICE VISIT (OUTPATIENT)
Dept: ORTHOPEDICS | Facility: CLINIC | Age: 75
End: 2025-02-25
Payer: MEDICARE

## 2025-02-25 VITALS
DIASTOLIC BLOOD PRESSURE: 87 MMHG | WEIGHT: 264 LBS | HEART RATE: 77 BPM | HEIGHT: 74 IN | BODY MASS INDEX: 33.88 KG/M2 | SYSTOLIC BLOOD PRESSURE: 131 MMHG

## 2025-02-25 DIAGNOSIS — M25.562 ACUTE PAIN OF BOTH KNEES: ICD-10-CM

## 2025-02-25 DIAGNOSIS — M25.561 ACUTE PAIN OF BOTH KNEES: ICD-10-CM

## 2025-02-25 DIAGNOSIS — M54.41 ACUTE RIGHT-SIDED LOW BACK PAIN WITH RIGHT-SIDED SCIATICA: Primary | ICD-10-CM

## 2025-02-25 DIAGNOSIS — M54.16 LUMBAR RADICULOPATHY, CHRONIC: ICD-10-CM

## 2025-02-25 DIAGNOSIS — M54.41 ACUTE RIGHT-SIDED LOW BACK PAIN WITH RIGHT-SIDED SCIATICA: ICD-10-CM

## 2025-02-25 PROCEDURE — 73562 X-RAY EXAM OF KNEE 3: CPT | Mod: 50,,, | Performed by: ORTHOPAEDIC SURGERY

## 2025-02-25 PROCEDURE — 72100 X-RAY EXAM L-S SPINE 2/3 VWS: CPT | Mod: ,,, | Performed by: ORTHOPAEDIC SURGERY

## 2025-02-25 RX ORDER — LISINOPRIL 20 MG/1
20 TABLET ORAL
COMMUNITY

## 2025-02-25 RX ORDER — AMLODIPINE BESYLATE 5 MG/1
5 TABLET ORAL
COMMUNITY

## 2025-02-25 RX ORDER — HYDROCODONE BITARTRATE AND ACETAMINOPHEN 7.5; 325 MG/1; MG/1
1 TABLET ORAL EVERY 6 HOURS PRN
COMMUNITY
Start: 2025-02-23 | End: 2025-02-28

## 2025-02-25 RX ORDER — ASPIRIN 81 MG/1
TABLET ORAL
COMMUNITY
Start: 2024-07-26

## 2025-02-25 RX ORDER — METHYLPREDNISOLONE 4 MG/1
TABLET ORAL
Qty: 21 EACH | Refills: 0 | Status: SHIPPED | OUTPATIENT
Start: 2025-02-25 | End: 2025-03-18

## 2025-02-25 RX ORDER — ATORVASTATIN CALCIUM 10 MG/1
10 TABLET, FILM COATED ORAL NIGHTLY
COMMUNITY

## 2025-02-25 RX ORDER — METHOCARBAMOL 750 MG/1
750 TABLET, FILM COATED ORAL EVERY 8 HOURS PRN
Qty: 30 TABLET | Refills: 0 | Status: SHIPPED | OUTPATIENT
Start: 2025-02-25 | End: 2025-03-07

## 2025-02-25 RX ORDER — FINASTERIDE 5 MG/1
5 TABLET, FILM COATED ORAL
COMMUNITY
Start: 2025-02-16

## 2025-02-25 NOTE — PROGRESS NOTES
Chief Complaint:   Chief Complaint   Patient presents with    Knee Pain     B/L knee pain--Hx of BL TKA 7 years ago. Pain started 3 days ago. Denies falls or injuries. Pain radiates into his inner thigh, hip and lower back. Pain is burning, shooting and he is experiencing spasms and cramps. Pt had injection and was prescribed some pain medication that is not helping to ease up the discomfort.        History of present illness:    History of Present Illness  The patient presents for evaluation of back pain.    He reports experiencing back pain that began on Sunday, accompanied by numbness and cramping sensations. He also describes muscle spasms and requests a prescription for potent analgesics.    Additionally, he mentions persistent popping sounds in his knees but expresses a desire to avoid further surgical intervention at this time.    Past Medical History:   Diagnosis Date    Hypertension     Mixed hyperlipidemia        Past Surgical History:   Procedure Laterality Date    TOTAL KNEE ARTHROPLASTY Bilateral     2018       Current Outpatient Medications   Medication Sig    amLODIPine (NORVASC) 5 MG tablet Take 5 mg by mouth.    aspirin (ECOTRIN) 81 MG EC tablet 1 tablet Orally twice a day    atorvastatin (LIPITOR) 10 MG tablet Take 10 mg by mouth every evening.    finasteride (PROSCAR) 5 mg tablet Take 5 mg by mouth.    HYDROcodone-acetaminophen (NORCO) 7.5-325 mg per tablet Take 1 tablet by mouth every 6 (six) hours as needed.    lisinopriL (PRINIVIL,ZESTRIL) 20 MG tablet Take 20 mg by mouth.    empagliflozin (JARDIANCE) 10 mg tablet Take 10 mg by mouth once daily.    methocarbamoL (ROBAXIN) 750 MG Tab Take 1 tablet (750 mg total) by mouth every 8 (eight) hours as needed (spasm).    methylPREDNISolone (MEDROL DOSEPACK) 4 mg tablet use as directed     No current facility-administered medications for this visit.       Review of patient's allergies indicates:  No Known Allergies    No family history on  file.    Social History[1]        Review of Systems:    Constitution: Negative for chills, fever, and sweats.  Negative for unexplained weight loss.    HENT:  Negative for headaches and blurry vision.    Cardiovascular:Negative for chest pain or irregular heart beat. Negative for hypertension.    Respiratory:  Negative for cough and shortness of breath.    Gastrointestinal: Negative for abdominal pain, heartburn, melena, nausea, and vomitting.    Genitourinary:  Negative bladder incontinence and dysuria.    Musculoskeletal:  See HPI    Neurological: Negative for numbness.    Psychiatric/Behavioral: Negative for depression.  The patient is not nervous/anxious.      Endocrine: Negative for polyuria    Hematologic/Lymphatic: Negative for bleeding problem.  Does not bruise/bleed easily.    Skin: Negative for poor would healing and rash      Physical Examination:    Vital Signs:    Vitals:    02/25/25 1445   BP: 131/87   Pulse: 77       Body mass index is 33.9 kg/m².    General: No acute distress, alert and oriented, healthy appearing    HEENT: Head is atraumatic, mucous membranes are moist    Neck: Supples, no JVD    Cardiovascular: Palpable dorsalis pedis and posterior tibial pulses, regular rate and rhythm to those pulses    Lungs: Breathing non-labored    Skin: no rashes appreciated    Neurologic: Can flex and extend knees, ankles, and toes. Sensation is grossly intact    Bilateral knees:  Patient's knees overall good range of motion he gets full extension.  Excellent flexion.  Continues to have a little bit of laxity to his MCL with a chronic in nature.  It is not significantly loosened.  This is stable.  Has a good endpoint.    X-rays:  Views bilateral knees reviewed today.  Patient with well-fixed prosthesis.  Does have some radiolucency surrounding his Press-Fit tibial component although no signs of loosening of his overall implants.  Three views of the lumbar spine reviewed.  Patient has degenerative changes at  5/1.  No overt slipped noted.  No significant instability seen.     Assessment::  Lumbar radiculopathy    Plan:  Most of the symptoms appear to be coming from her lumbar spine.  We will plan to treat this conservatively with physical therapy.  We will also give him some steroids as well as some spasm medication.  See him back in 6-8 weeks.  Next we would be an MRI and get him set up for injections.    This note was generated with the assistance of ambient listening technology. Verbal consent was obtained by the patient and accompanying visitor(s) for the recording of patient appointment to facilitate this note. I attest to having reviewed and edited the generated note for accuracy, though some syntax or spelling errors may persist. Please contact the author of this note for any clarification.      This note was created using Kitani voice recognition software that occasionally misinterpreted phrases or words.    Consult note is delivered via Epic messaging service.         [1]   Social History  Socioeconomic History    Marital status:    Tobacco Use    Smoking status: Never    Smokeless tobacco: Never   Substance and Sexual Activity    Alcohol use: Not Currently    Drug use: Yes     Types: Marijuana     Comment: Medical    Sexual activity: Yes     Partners: Female

## 2025-03-17 ENCOUNTER — HOSPITAL ENCOUNTER (EMERGENCY)
Facility: HOSPITAL | Age: 75
Discharge: HOME | End: 2025-03-17
Attending: STUDENT IN AN ORGANIZED HEALTH CARE EDUCATION/TRAINING PROGRAM
Payer: MEDICARE

## 2025-03-17 VITALS
BODY MASS INDEX: 32.85 KG/M2 | HEART RATE: 79 BPM | RESPIRATION RATE: 18 BRPM | TEMPERATURE: 97 F | SYSTOLIC BLOOD PRESSURE: 145 MMHG | HEIGHT: 74 IN | DIASTOLIC BLOOD PRESSURE: 96 MMHG | OXYGEN SATURATION: 99 % | WEIGHT: 256 LBS

## 2025-03-17 DIAGNOSIS — M79.606 LEG PAIN: ICD-10-CM

## 2025-03-17 DIAGNOSIS — R52 PAIN: ICD-10-CM

## 2025-03-17 DIAGNOSIS — M54.41 ACUTE LOW BACK PAIN WITH RIGHT-SIDED SCIATICA, UNSPECIFIED BACK PAIN LATERALITY: Primary | ICD-10-CM

## 2025-03-17 DIAGNOSIS — R42 DIZZINESS: ICD-10-CM

## 2025-03-17 DIAGNOSIS — R42 VERTIGO: ICD-10-CM

## 2025-03-17 LAB
ALBUMIN SERPL-MCNC: 3.9 G/DL (ref 3.4–4.8)
ALBUMIN/GLOB SERPL: 1.3 RATIO (ref 1.1–2)
ALP SERPL-CCNC: 39 UNIT/L (ref 40–150)
ALT SERPL-CCNC: 17 UNIT/L (ref 0–55)
ANION GAP SERPL CALC-SCNC: 8 MEQ/L
APTT PPP: 28 SECONDS (ref 23.2–33.7)
AST SERPL-CCNC: 16 UNIT/L (ref 5–34)
BACTERIA #/AREA URNS AUTO: ABNORMAL /HPF
BASOPHILS # BLD AUTO: 0.04 X10(3)/MCL
BASOPHILS NFR BLD AUTO: 0.7 %
BILIRUB SERPL-MCNC: 0.3 MG/DL
BILIRUB UR QL STRIP.AUTO: NEGATIVE
BNP BLD-MCNC: <10 PG/ML
BUN SERPL-MCNC: 12.6 MG/DL (ref 8.4–25.7)
CALCIUM SERPL-MCNC: 9 MG/DL (ref 8.8–10)
CHLORIDE SERPL-SCNC: 105 MMOL/L (ref 98–107)
CLARITY UR: CLEAR
CO2 SERPL-SCNC: 23 MMOL/L (ref 23–31)
COLOR UR AUTO: COLORLESS
CREAT SERPL-MCNC: 0.91 MG/DL (ref 0.72–1.25)
CREAT/UREA NIT SERPL: 14
DIGOXIN SERPL-MCNC: <0.2 NG/ML (ref 0.8–2)
EOSINOPHIL # BLD AUTO: 0.27 X10(3)/MCL (ref 0–0.9)
EOSINOPHIL NFR BLD AUTO: 4.7 %
ERYTHROCYTE [DISTWIDTH] IN BLOOD BY AUTOMATED COUNT: 12.4 % (ref 11.5–17)
FLUAV AG UPPER RESP QL IA.RAPID: NOT DETECTED
FLUBV AG UPPER RESP QL IA.RAPID: NOT DETECTED
GFR SERPLBLD CREATININE-BSD FMLA CKD-EPI: >60 ML/MIN/1.73/M2
GLOBULIN SER-MCNC: 3 GM/DL (ref 2.4–3.5)
GLUCOSE SERPL-MCNC: 147 MG/DL (ref 82–115)
GLUCOSE UR QL STRIP: ABNORMAL
HCT VFR BLD AUTO: 41.9 % (ref 42–52)
HGB BLD-MCNC: 14.1 G/DL (ref 14–18)
HGB UR QL STRIP: ABNORMAL
IMM GRANULOCYTES # BLD AUTO: 0.01 X10(3)/MCL (ref 0–0.04)
IMM GRANULOCYTES NFR BLD AUTO: 0.2 %
INR PPP: 1
KETONES UR QL STRIP: NEGATIVE
LEUKOCYTE ESTERASE UR QL STRIP: NEGATIVE
LYMPHOCYTES # BLD AUTO: 2.77 X10(3)/MCL (ref 0.6–4.6)
LYMPHOCYTES NFR BLD AUTO: 48.1 %
MCH RBC QN AUTO: 31.9 PG (ref 27–31)
MCHC RBC AUTO-ENTMCNC: 33.7 G/DL (ref 33–36)
MCV RBC AUTO: 94.8 FL (ref 80–94)
MONOCYTES # BLD AUTO: 0.5 X10(3)/MCL (ref 0.1–1.3)
MONOCYTES NFR BLD AUTO: 8.7 %
NEUTROPHILS # BLD AUTO: 2.17 X10(3)/MCL (ref 2.1–9.2)
NEUTROPHILS NFR BLD AUTO: 37.6 %
NITRITE UR QL STRIP: NEGATIVE
NRBC BLD AUTO-RTO: 0 %
OHS QRS DURATION: 78 MS
OHS QTC CALCULATION: 457 MS
PH UR STRIP: 5.5 [PH]
PLATELET # BLD AUTO: 168 X10(3)/MCL (ref 130–400)
PMV BLD AUTO: 10.9 FL (ref 7.4–10.4)
POTASSIUM SERPL-SCNC: 3.8 MMOL/L (ref 3.5–5.1)
PROT SERPL-MCNC: 6.9 GM/DL (ref 5.8–7.6)
PROT UR QL STRIP: NEGATIVE
PROTHROMBIN TIME: 13.7 SECONDS (ref 12.5–14.5)
RBC # BLD AUTO: 4.42 X10(6)/MCL (ref 4.7–6.1)
RBC #/AREA URNS AUTO: ABNORMAL /HPF
SARS-COV-2 RNA RESP QL NAA+PROBE: NOT DETECTED
SODIUM SERPL-SCNC: 136 MMOL/L (ref 136–145)
SP GR UR STRIP.AUTO: 1.01 (ref 1–1.03)
SQUAMOUS #/AREA URNS LPF: ABNORMAL /HPF
TROPONIN I SERPL-MCNC: 0.06 NG/ML (ref 0–0.04)
TROPONIN I SERPL-MCNC: <0.01 NG/ML (ref 0–0.04)
UROBILINOGEN UR STRIP-ACNC: NORMAL
WBC # BLD AUTO: 5.76 X10(3)/MCL (ref 4.5–11.5)
WBC #/AREA URNS AUTO: ABNORMAL /HPF

## 2025-03-17 PROCEDURE — 63600175 PHARM REV CODE 636 W HCPCS: Performed by: STUDENT IN AN ORGANIZED HEALTH CARE EDUCATION/TRAINING PROGRAM

## 2025-03-17 PROCEDURE — 93010 ELECTROCARDIOGRAM REPORT: CPT | Mod: ,,, | Performed by: INTERNAL MEDICINE

## 2025-03-17 PROCEDURE — 99285 EMERGENCY DEPT VISIT HI MDM: CPT | Mod: 25

## 2025-03-17 PROCEDURE — 25500020 PHARM REV CODE 255: Performed by: STUDENT IN AN ORGANIZED HEALTH CARE EDUCATION/TRAINING PROGRAM

## 2025-03-17 PROCEDURE — 80162 ASSAY OF DIGOXIN TOTAL: CPT | Performed by: STUDENT IN AN ORGANIZED HEALTH CARE EDUCATION/TRAINING PROGRAM

## 2025-03-17 PROCEDURE — 80053 COMPREHEN METABOLIC PANEL: CPT | Performed by: STUDENT IN AN ORGANIZED HEALTH CARE EDUCATION/TRAINING PROGRAM

## 2025-03-17 PROCEDURE — 93005 ELECTROCARDIOGRAM TRACING: CPT

## 2025-03-17 PROCEDURE — 96372 THER/PROPH/DIAG INJ SC/IM: CPT | Performed by: STUDENT IN AN ORGANIZED HEALTH CARE EDUCATION/TRAINING PROGRAM

## 2025-03-17 PROCEDURE — 93010 ELECTROCARDIOGRAM REPORT: CPT | Mod: ,,, | Performed by: STUDENT IN AN ORGANIZED HEALTH CARE EDUCATION/TRAINING PROGRAM

## 2025-03-17 PROCEDURE — 83880 ASSAY OF NATRIURETIC PEPTIDE: CPT | Performed by: STUDENT IN AN ORGANIZED HEALTH CARE EDUCATION/TRAINING PROGRAM

## 2025-03-17 PROCEDURE — 96375 TX/PRO/DX INJ NEW DRUG ADDON: CPT

## 2025-03-17 PROCEDURE — 85730 THROMBOPLASTIN TIME PARTIAL: CPT | Performed by: STUDENT IN AN ORGANIZED HEALTH CARE EDUCATION/TRAINING PROGRAM

## 2025-03-17 PROCEDURE — 25000003 PHARM REV CODE 250: Performed by: STUDENT IN AN ORGANIZED HEALTH CARE EDUCATION/TRAINING PROGRAM

## 2025-03-17 PROCEDURE — 85610 PROTHROMBIN TIME: CPT | Performed by: STUDENT IN AN ORGANIZED HEALTH CARE EDUCATION/TRAINING PROGRAM

## 2025-03-17 PROCEDURE — 0240U COVID/FLU A&B PCR: CPT | Performed by: STUDENT IN AN ORGANIZED HEALTH CARE EDUCATION/TRAINING PROGRAM

## 2025-03-17 PROCEDURE — 84484 ASSAY OF TROPONIN QUANT: CPT | Performed by: STUDENT IN AN ORGANIZED HEALTH CARE EDUCATION/TRAINING PROGRAM

## 2025-03-17 PROCEDURE — 96374 THER/PROPH/DIAG INJ IV PUSH: CPT

## 2025-03-17 PROCEDURE — 81001 URINALYSIS AUTO W/SCOPE: CPT | Performed by: STUDENT IN AN ORGANIZED HEALTH CARE EDUCATION/TRAINING PROGRAM

## 2025-03-17 PROCEDURE — 85025 COMPLETE CBC W/AUTO DIFF WBC: CPT | Performed by: STUDENT IN AN ORGANIZED HEALTH CARE EDUCATION/TRAINING PROGRAM

## 2025-03-17 RX ORDER — KETOROLAC TROMETHAMINE 30 MG/ML
15 INJECTION, SOLUTION INTRAMUSCULAR; INTRAVENOUS
Status: COMPLETED | OUTPATIENT
Start: 2025-03-17 | End: 2025-03-17

## 2025-03-17 RX ORDER — MORPHINE SULFATE 4 MG/ML
4 INJECTION, SOLUTION INTRAMUSCULAR; INTRAVENOUS
Refills: 0 | Status: COMPLETED | OUTPATIENT
Start: 2025-03-17 | End: 2025-03-17

## 2025-03-17 RX ORDER — LORAZEPAM 1 MG/1
1 TABLET ORAL EVERY 4 HOURS PRN
Status: DISCONTINUED | OUTPATIENT
Start: 2025-03-17 | End: 2025-03-17 | Stop reason: HOSPADM

## 2025-03-17 RX ORDER — MECLIZINE HYDROCHLORIDE 25 MG/1
25 TABLET ORAL 3 TIMES DAILY PRN
Qty: 20 TABLET | Refills: 0 | Status: SHIPPED | OUTPATIENT
Start: 2025-03-17

## 2025-03-17 RX ORDER — ONDANSETRON HYDROCHLORIDE 2 MG/ML
4 INJECTION, SOLUTION INTRAVENOUS
Status: COMPLETED | OUTPATIENT
Start: 2025-03-17 | End: 2025-03-17

## 2025-03-17 RX ORDER — ORPHENADRINE CITRATE 30 MG/ML
60 INJECTION INTRAMUSCULAR; INTRAVENOUS
Status: COMPLETED | OUTPATIENT
Start: 2025-03-17 | End: 2025-03-17

## 2025-03-17 RX ORDER — METHOCARBAMOL 500 MG/1
1000 TABLET, FILM COATED ORAL 3 TIMES DAILY
Qty: 30 TABLET | Refills: 0 | Status: SHIPPED | OUTPATIENT
Start: 2025-03-17 | End: 2025-03-22

## 2025-03-17 RX ORDER — OXYCODONE AND ACETAMINOPHEN 10; 325 MG/1; MG/1
1 TABLET ORAL EVERY 8 HOURS PRN
Qty: 15 TABLET | Refills: 0 | Status: SHIPPED | OUTPATIENT
Start: 2025-03-17 | End: 2025-03-22

## 2025-03-17 RX ADMIN — ORPHENADRINE CITRATE 60 MG: 60 INJECTION INTRAMUSCULAR; INTRAVENOUS at 02:03

## 2025-03-17 RX ADMIN — LORAZEPAM 1 MG: 1 TABLET ORAL at 11:03

## 2025-03-17 RX ADMIN — KETOROLAC TROMETHAMINE 15 MG: 30 INJECTION, SOLUTION INTRAMUSCULAR at 02:03

## 2025-03-17 RX ADMIN — IOHEXOL 100 ML: 350 INJECTION, SOLUTION INTRAVENOUS at 07:03

## 2025-03-17 RX ADMIN — ONDANSETRON 4 MG: 2 INJECTION INTRAMUSCULAR; INTRAVENOUS at 08:03

## 2025-03-17 RX ADMIN — MORPHINE SULFATE 4 MG: 4 INJECTION INTRAVENOUS at 08:03

## 2025-03-17 NOTE — ED PROVIDER NOTES
Encounter Date: 3/17/2025    SCRIBE #1 NOTE: I, Jesica Bellamy, am scribing for, and in the presence of,  Audi Sandoval MD. I have scribed the following portions of the note - the EKG reading. Other sections scribed: HPI, ROS, PE.     History     Chief Complaint   Patient presents with    Dizziness     C/o woke up with dizziness at 0445. LKN 0000. Also c/o burning pain to R thigh, seeing Dr. Andres for this ;Rx muscle relaxers and pain meds but has only taken 3 tylenol, last dose 0000.     Patient is a 73 y/o male with a PMHx of HTN and HLD presents to the ED for dizziness beginning ~4:45 this morning. Patient reports turning in bed and feeling the room spin. He reports going to bed at midnight. He reports he was unable to stand up due to dizziness. He reports unchanged right thigh pain, sees Dr. Andres. He reports having hardware in both knees. He reports history of a pinched nerve in his back. He denies weakness, numbness, nausea, vomiting, fever, headache, chest pain, or shortness of breath.    The history is provided by the patient and medical records. No  was used.     Review of patient's allergies indicates:  No Known Allergies  Past Medical History:   Diagnosis Date    Hypertension     Mixed hyperlipidemia      Past Surgical History:   Procedure Laterality Date    TOTAL KNEE ARTHROPLASTY Bilateral     2018     No family history on file.  Social History[1]  Review of Systems   Constitutional:  Negative for fever.   HENT:  Negative for sore throat.    Eyes:  Negative for visual disturbance.   Respiratory:  Negative for shortness of breath.    Cardiovascular:  Negative for chest pain.   Gastrointestinal:  Negative for abdominal pain, nausea and vomiting.   Genitourinary:  Negative for dysuria.   Musculoskeletal:  Negative for joint swelling.        Positive for right thigh pain.   Skin:  Negative for rash.   Neurological:  Positive for dizziness. Negative for weakness, numbness and  headaches.   Psychiatric/Behavioral:  Negative for confusion.    All other systems reviewed and are negative.      Physical Exam     Initial Vitals [03/17/25 0521]   BP Pulse Resp Temp SpO2   (!) 156/102 96 18 97.3 °F (36.3 °C) 99 %      MAP       --         Physical Exam    Nursing note and vitals reviewed.  Constitutional: He appears well-developed and well-nourished. He is not diaphoretic. No distress.   HENT:   Head: Normocephalic and atraumatic.   Eyes: Conjunctivae and EOM are normal. Pupils are equal, round, and reactive to light.   Neck:   Normal range of motion.  Cardiovascular:  Normal rate, regular rhythm, normal heart sounds and intact distal pulses.           No murmur heard.  Pulmonary/Chest: Breath sounds normal. No respiratory distress. He has no wheezes. He has no rales.   Abdominal: Abdomen is soft. He exhibits no distension. There is no abdominal tenderness.   Musculoskeletal:         General: Tenderness present. No edema. Normal range of motion.      Cervical back: Normal range of motion.      Comments: Right thigh tenderness to palpation.     Neurological: He is alert and oriented to person, place, and time. No cranial nerve deficit.   5/5 bilateral upper extremity strength.  Decrease RLE strength, 4/5 due to pain. No acute neuro deficits.    Skin: Skin is warm and dry. Capillary refill takes less than 2 seconds. No rash noted. No erythema.   Psychiatric: He has a normal mood and affect.       ED Course   Procedures  Labs Reviewed   COMPREHENSIVE METABOLIC PANEL - Abnormal       Result Value    Sodium 136      Potassium 3.8      Chloride 105      CO2 23      Glucose 147 (*)     Blood Urea Nitrogen 12.6      Creatinine 0.91      Calcium 9.0      Protein Total 6.9      Albumin 3.9      Globulin 3.0      Albumin/Globulin Ratio 1.3      Bilirubin Total 0.3      ALP 39 (*)     ALT 17      AST 16      eGFR >60      Anion Gap 8.0      BUN/Creatinine Ratio 14     TROPONIN I - Abnormal    Troponin-I  0.062 (*)    URINALYSIS, REFLEX TO URINE CULTURE - Abnormal    Color, UA Colorless      Appearance, UA Clear      Specific Gravity, UA 1.007      pH, UA 5.5      Protein, UA Negative      Glucose, UA 3+ (*)     Ketones, UA Negative      Blood, UA 1+ (*)     Bilirubin, UA Negative      Urobilinogen, UA Normal      Nitrites, UA Negative      Leukocyte Esterase, UA Negative      RBC, UA 0-5      WBC, UA 0-5      Bacteria, UA None Seen      Squamous Epithelial Cells, UA None Seen     CBC WITH DIFFERENTIAL - Abnormal    WBC 5.76      RBC 4.42 (*)     Hgb 14.1      Hct 41.9 (*)     MCV 94.8 (*)     MCH 31.9 (*)     MCHC 33.7      RDW 12.4      Platelet 168      MPV 10.9 (*)     Neut % 37.6      Lymph % 48.1      Mono % 8.7      Eos % 4.7      Basophil % 0.7      Imm Grans % 0.2      Neut # 2.17      Lymph # 2.77      Mono # 0.50      Eos # 0.27      Baso # 0.04      Imm Gran # 0.01      NRBC% 0.0     DIGOXIN LEVEL - Abnormal    Digoxin Level <0.2 (*)    B-TYPE NATRIURETIC PEPTIDE - Normal    Natriuretic Peptide <10.0     APTT - Normal    PTT 28.0     PROTIME-INR - Normal    PT 13.7      INR 1.0      Narrative:     Protimes are used to monitor anticoagulant agents such as warfarin. PT INR values are based on the current patient normal mean and the YESSENIA value for the specific instrument reagent used.  **Routine theraputic target values for the INR are 2.0-3.0**   COVID/FLU A&B PCR - Normal    Influenza A PCR Not Detected      Influenza B PCR Not Detected      SARS-CoV-2 PCR Not Detected      Narrative:     The Xpert Xpress SARS-CoV-2/FLU/RSV plus is a rapid, multiplexed real-time PCR test intended for the simultaneous qualitative detection and differentiation of SARS-CoV-2, Influenza A, Influenza B, and respiratory syncytial virus (RSV) viral RNA in either nasopharyngeal swab or nasal swab specimens.         TROPONIN I - Normal    Troponin-I <0.010     CBC W/ AUTO DIFFERENTIAL    Narrative:     The following orders were  created for panel order CBC auto differential.  Procedure                               Abnormality         Status                     ---------                               -----------         ------                     CBC with Differential[[id number]]       Abnormal            Final result                 Please view results for these tests on the individual orders.     EKG Readings: (Independently Interpreted)   Initial Reading: No STEMI. Rhythm: Normal Sinus Rhythm. Heart Rate: 87. Ectopy: No Ectopy. Conduction: Normal. ST Segments: Normal ST Segments. T Waves: Normal. Clinical Impression: Normal Sinus Rhythm and Left Ventricular Hypertrophy (LVH)   Done at 5:32 on 3/17/25     ECG Results              EKG 12-lead (Final result)        Collection Time Result Time QRS Duration OHS QTC Calculation    03/17/25 05:33:50 03/18/25 07:36:14 92 442                     Final result by Interface, Lab In [facility] (03/18/25 07:36:19)                   Narrative:    Test Reason : R42,    Vent. Rate :  87 BPM     Atrial Rate :  87 BPM     P-R Int : 194 ms          QRS Dur :  92 ms      QT Int : 368 ms       P-R-T Axes :  49 -19  -3 degrees    QTcB Int : 442 ms    Normal sinus rhythm  Minimal voltage criteria for LVH, may be normal variant ( R in aVL )  Cannot rule out Anterior infarct ,age undetermined  Abnormal ECG  When compared with ECG of 17-Mar-2025 05:22,  Criteria for Inferior infarct are no longer Present  Inverted T waves have replaced nonspecific T wave abnormality in Inferior  leads  Nonspecific T wave abnormality, improved in Lateral leads  Confirmed by Geovanni Nichole ([id number]) on 3/18/2025 7:36:11 AM    Referred By: AAAREFERRAL SELF           Confirmed By: Geovanni Nichole                                     EKG 12-lead (Final result)        Collection Time Result Time QRS Duration OHS QTC Calculation    03/17/25 05:22:11 03/17/25 10:47:13 78 457                     Final result by Interface, Lab In [facility] (03/17/25  10:47:18)                   Narrative:    Test Reason : R42,    Vent. Rate :  86 BPM     Atrial Rate :  86 BPM     P-R Int : 198 ms          QRS Dur :  78 ms      QT Int : 382 ms       P-R-T Axes :  62 -17  -1 degrees    QTcB Int : 457 ms    Baseline Artifact  Normal sinus rhythm  Inferior infarct ,age undetermined  Abnormal ECG  No previous ECGs available  Confirmed by Cherie Murillo ([id number]) on 3/17/2025 10:47:11 AM    Referred By: AAAREFERRAL SELF           Confirmed By: Cherie Murillo                                     EKG 12-lead (Final result)  Result time 03/20/25 14:11:17      Final result by Unknown User (03/20/25 14:11:17)                                      Imaging Results              MRI Lumbar Spine Without Contrast (Final result)  Result time 03/17/25 12:40:59      Final result by Candelario Koenig MD (03/17/25 12:40:59)                   Impression:      Lumbar degenerative disc disease and spondylosis level by level discussed above.  These findings are most pronounced at the level of L5-S1.      Electronically signed by: Candelario Koenig  Date:    03/17/2025  Time:    12:40               Narrative:    EXAMINATION:  MRI LUMBAR SPINE WITHOUT CONTRAST    TECHNIQUE:  Low back pain, progressive neurologic deficit;    COMPARISON:  CT lumbar spine March 17, 2025.    FINDINGS:  Lumbar vertebrae stature is preserved and the alignment is unremarkable.  Small hemangiomas involve L3 and L4 vertebral bodies.  There are no acute marrow edematous signals.  Disc space and endplates degenerative signals are most apparent at the level of L5-S1.  The visualized thoracic cord is unremarkable. The conus medullaris terminates at T12-L1.  Disc segmental analysis is given below:    At L1-L2, disc is unremarkable.  Bilateral minimal facet arthropathy.  Central canal is not stenosed.  Bilateral neural foramen are patent.    At L2-L3, disc is unremarkable.  Bilateral thickening ligamentum flavum and facet arthropathy.   There is no significant central canal stenosis.  Bilateral neural foramen are patent.    At L3-L4, there is generalized disc bulge which indents and slightly flattens the ventral thecal sac.  Bilateral facet arthropathy.  In conjunction, these findings cause mild central stenosis.  There are no narrowings of the neural foramen.    At L4-L5, there is centrally extruded disc which partially migrates posterior to L5 vertebral body.  Disc causes mild central compression upon the thecal sac.  There is also bilateral facet arthropathy.  These findings combine to cause mild central canal stenosis.  There are no significant narrowings of the neural foramen.    At L5-S1, there is broad osteophyte disc complex which is more pronounced in the left paracentral location and also lateralizes onto the proximal left neural foramen.  There is also bilateral uncovertebral and facet arthropathy.  In conjunction, these findings cause mild central canal stenosis.  There is moderate narrowing of the right neural foramen.  There is effacement of the left lateral recess with compression upon the left exiting nerve root and also marked narrowing of the left neural foramen.                                       CT Lumbar Spine Without Contrast (Final result)  Result time 03/17/25 08:33:35      Final result by Haven López MD (03/17/25 08:33:35)                   Impression:      1. No acute fracture identified.  2. Multilevel degenerative changes of the lumbar spine, with suspected disc extrusion at L4-5.      Electronically signed by: Haven López  Date:    03/17/2025  Time:    08:33               Narrative:    EXAMINATION:  CT LUMBAR SPINE WITHOUT CONTRAST    CLINICAL HISTORY:  Low back pain, symptoms persist with > 6wks conservative treatment;Low back pain, progressive neurologic deficit;    TECHNIQUE:  Noncontrast CT images of the cervical spine. Axial, coronal, and sagittal reformatted images were obtained. Dose length  product is 1164 mGycm. Automatic exposure control, adjustment of mA/kV or iterative reconstruction technique was used to limit radiation dose.    COMPARISON:  X-rays dated 02/25/2025    FINDINGS:  There are 5 non-rib-bearing lumbar type vertebral bodies.  Alignment is normal.  The vertebral body heights are maintained.  There is no acute fracture identified.  There is moderate to severe disc height loss at L5-S1 with small marginal osteophytes.  There is mild facet arthropathy.  There is suspected central disc extrusion at L4-5 measuring 5 mm in AP dimension, with moderate narrowing of the canal.  There is moderate neural foraminal stenosis bilaterally at L5-S1.  The paraspinal soft tissues are unremarkable.                                       CT Head Without Contrast (Final result)  Result time 03/17/25 08:18:21      Final result by Kvng Jorgensen MD (03/17/25 08:18:21)                   Impression:      No acute intracranial findings.      Electronically signed by: Kvng Jorgensen  Date:    03/17/2025  Time:    08:18               Narrative:    EXAMINATION:  CT HEAD WITHOUT CONTRAST    CLINICAL HISTORY:  Dizziness, persistent/recurrent, cardiac or vascular cause suspected;    TECHNIQUE:  CT imaging of the head performed from the skull base to the vertex without intravenous contrast.   mGycm. Automatic exposure control, adjustment of mA/kV or iterative reconstruction technique was used to reduce radiation.    COMPARISON:  None Available.    FINDINGS:  There is no acute cortical infarct, hemorrhage or mass lesion.  The ventricles are normal in size.    There is paranasal sinus inflammation.  Mastoid air cells are clear.                                       CTA Head and Neck (xpd) (Final result)  Result time 03/17/25 08:10:34      Final result by Haven López MD (03/17/25 08:10:34)                   Impression:      No large vessel occlusion or flow-limiting stenosis.      Electronically signed  by: Haven López  Date:    03/17/2025  Time:    08:10               Narrative:    EXAMINATION:  CTA HEAD AND NECK (XPD)    CLINICAL HISTORY:  Dizziness, persistent/recurrent, cardiac or vascular cause suspected;    TECHNIQUE:  Axial images obtained through the cervical region and [place] before and after the administration of intravenous contrast.    Coronal, sagittal, MIP and 3D reconstructions were obtained from the axial data.    Automatic exposure control was utilized to limit radiation dose.    Radiation Dose:    Total DLP: 2201 mGy*cm    COMPARISON:  CT head from the same day    FINDINGS:  Head CT with contrast:    No interval changes when compared to the previous CT.    No enhancing abnormalities.    If present, stenosis of the carotid bulbs is measured based on NASCET criteria,    i.e. area of maximal stenosis compared to the cervical ICA distal to the bulb.    Cervical CTA:    The origins of the great vessels are patent.    The common carotid arteries, carotid bulbs and internal carotid are patent.    The vertebral arteries are patent.    Intracranial CTA:    The internal carotid arteries, middle cerebral arteries and anterior cerebral arteries are patent.    The vertebral arteries, basilar artery and posterior cerebral arteries are patent.    The dural venous sinuses are patent.                                       X-Ray Femur Ap/Lat Right (Final result)  Result time 03/17/25 06:57:09      Final result by Haven López MD (03/17/25 06:57:09)                   Impression:      No acute bony abnormality.      Electronically signed by: Haven López  Date:    03/17/2025  Time:    06:57               Narrative:    EXAMINATION:  XR FEMUR 2 VIEW RIGHT    CLINICAL HISTORY:  Pain, unspecified    COMPARISON:  None.    FINDINGS:  There is no displaced fracture identified.  The soft tissues are unremarkable.                                       Medications   morphine injection 4 mg (4 mg  Intravenous Given 3/17/25 0803)   ondansetron injection 4 mg (4 mg Intravenous Given 3/17/25 0803)   iohexoL (OMNIPAQUE 350) injection 100 mL (100 mLs Intravenous Given 3/17/25 0758)   orphenadrine injection 60 mg (60 mg Intramuscular Given 3/17/25 1442)   ketorolac injection 15 mg (15 mg Intravenous Given 3/17/25 1442)     Medical Decision Making  Judging by the patient's chief complaint and pertinent history, the patient has the following possible differential diagnoses, including but not limited to the following.  Some of these are deemed to be lower likelihood and some more likely based on my physical exam and history combined with possible lab work and/or imaging studies.   Please see the pertinent studies, and refer to the HPI.  Some of these diagnoses will take further evaluation to fully rule out, perhaps as an outpatient and the patient was encouraged to follow up when discharged for more comprehensive evaluation.    anemia, dehydration, electrolyte derangement, hypoglycemia, infection, intoxication, respiratory failure, toxic ingestion, ACS, thyroid disease, medications, psychiatric, autoimmune, rhabdomyolysis, myositis, peripheral neuropathy, CVA, ICH, vertigo, back sprain, strain, contusion, DDD, disc herniation, spinal stenosis, spondylitis, fracture, mass, spinal cord compression,     Patient is a 74-year-old male presents to the emergency department for multiple medical complaints.  He endorses dizziness and vertigo.  Recreating with movement of the head.  CT of the head obtained which did not show any evidence of bleed or infarct.  CTA without any evidence of infarct.  On my assessment patient primarily complaining of lower back pain and radicular pain in the right thigh.  X-ray of the femur does not show any abnormality.  Falls with Orthopedic surgery.  He states having significant pain, difficulty ambulating due to the discomfort.  As a result MRI obtained.  No evidence of cauda equina.  Pain  controlled.  Able to stand and ambulate on reassessment.  Shared decision making used to determine disposition.  Discussed need for follow-up with primary care.  Denies any bladder or bowel incontinence.  Discussed strict return precautions including to return if any new weakness, numbness loss of bladder or bowel function, fever, or any other concerns.  Reassessed patient.  Patient is resting comfortably.  Discussed all results.  Discussed need for follow-up.  Discussed return precautions.  Answered all questions at this time.  Hemodynamically stable for continued outpatient management with strict return precautions.  Patient and family verbalized understanding agreed to plan.      Problems Addressed:  Acute low back pain with right-sided sciatica, unspecified back pain laterality: acute illness or injury that poses a threat to life or bodily functions  Dizziness: acute illness or injury that poses a threat to life or bodily functions  Leg pain: acute illness or injury that poses a threat to life or bodily functions  Pain: acute illness or injury that poses a threat to life or bodily functions  Vertigo: acute illness or injury that poses a threat to life or bodily functions    Amount and/or Complexity of Data Reviewed  Labs: ordered.  Radiology: ordered.  ECG/medicine tests: ordered and independent interpretation performed.     Details: No STEMI. Rhythm: Normal Sinus Rhythm. Heart Rate: 87. Ectopy: No Ectopy. Conduction: Normal. ST Segments: Normal ST Segments. T Waves: Normal. Clinical Impression: Normal Sinus Rhythm and Left Ventricular Hypertrophy (LVH)   Done at 5:32 on 3/17/25     Risk  Prescription drug management.  Parenteral controlled substances.  Decision regarding hospitalization.            Scribe Attestation:   Scribe #1: I performed the above scribed service and the documentation accurately describes the services I performed. I attest to the accuracy of the note.    Attending Attestation:            Physician Attestation for Scribe:  Physician Attestation Statement for Scribe #1: I, Audi Sandoval MD, reviewed documentation, as scribed by Jesica Bellamy in my presence, and it is both accurate and complete.           ED Course as of 25 1554   Mon Mar 17, 2025   0613 Medication Sig Dispense Quantity Refills Last Filled Start Date End Date  atorvastatin (LIPITOR) 20 mg tablet  Take 1 tablet by mouth at bedtime.            digoxin (LANOXIN) 125 mcg (0.125 mg) tablet  Take 1 tablet by mouth in the morning.            famotidine (PEPCID) 20 mg tablet  Take 1 tablet by mouth in the morning and 1 tablet before bedtime.            lisinopril 10 mg Tab 10 mg, hydroCHLOROthiazide 12.5 MG Tab 12.5 mg  Take 10 mg by mouth daily.            midodrine (PROAMATINE) 2.5 mg tablet  Take 1 tablet by mouth Three (3) times daily.            HYDROcodone-acetaminophen (NORCO) 7.5-325 mg per tablet   Indications: Acute back pain with sciatica, right Take 1 tablet by mouth every 6 (six) hours as needed for Pain for up to 5 days. 12 tablet      2025     [RP]   1440 Negative for deep and superficial vein thrombosis in the right lower extremity.         [RP]      ED Course User Index  [RP] Audi Sandoval MD                           Clinical Impression:  Final diagnoses:  [R42] Vertigo  [R42] Dizziness  [R52] Pain  [M79.606] Leg pain  [M54.41] Acute low back pain with right-sided sciatica, unspecified back pain laterality (Primary)          ED Disposition Condition    Discharge Stable          ED Prescriptions       Medication Sig Dispense Start Date End Date Auth. Provider    oxyCODONE-acetaminophen (PERCOCET)  mg per tablet () Take 1 tablet by mouth every 8 (eight) hours as needed for Pain. 15 tablet 3/17/2025 3/22/2025 Audi Sandoval MD    methocarbamoL (ROBAXIN) 500 MG Tab () Take 2 tablets (1,000 mg total) by mouth 3 (three) times daily. for 5 days 30 tablet 3/17/2025 3/22/2025  Audi Sandoval MD    meclizine (ANTIVERT) 25 mg tablet Take 1 tablet (25 mg total) by mouth 3 (three) times daily as needed for Dizziness. 20 tablet 3/17/2025 -- Audi Sandoval MD          Follow-up Information       Follow up With Specialties Details Why Contact Info    Pascual Peraza MD Neurosurgery Call in 1 day  [address] [place] [address] [address]  [place] [address] [phone]      Margaux Alejo MD Neurosurgery Call   [address] [place] [address] [address]  [place] [id number] [phone]      Andrade Mcgill MD Neurosurgery Call   [address] [facility]  [address]  [facility] [id number] [phone]      Chaparro Harley MD Neurosurgery Call   [address]  [facility] [id number] [phone]      Marcos Barron MD [address] [address] [phone]                   [1]  Social History  Tobacco Use    Smoking status: Never    Smokeless tobacco: Never   Substance Use Topics    Alcohol use: Not Currently    Drug use: Yes     Types: Marijuana     Comment: Medical      Audi Sandoval MD  04/07/25 1565

## 2025-03-17 NOTE — DISCHARGE INSTRUCTIONS
Follow-up with the primary care physician.      Follow-up with spine surgery.      Continue taking medications as prescribed.      You may take Percocet as needed for severe pain.  You may take Robaxin for muscle relaxer.      Return to the emergency department if any new or worsening symptoms, chest pain, shortness of breath, nausea, vomiting, difficulty breathing, fever, headache, or any other concerns.

## 2025-03-18 LAB
OHS QRS DURATION: 92 MS
OHS QTC CALCULATION: 442 MS

## 2025-03-19 ENCOUNTER — DOCUMENTATION ONLY (OUTPATIENT)
Dept: CASE MANAGEMENT | Facility: HOSPITAL | Age: 75
End: 2025-03-19
Payer: MEDICARE

## 2025-03-25 ENCOUNTER — TELEPHONE (OUTPATIENT)
Dept: NEUROSURGERY | Facility: CLINIC | Age: 75
End: 2025-03-25
Payer: MEDICARE

## 2025-03-25 NOTE — TELEPHONE ENCOUNTER
Patient referred to clinic by Dr. Audi Sandoval for acute low back pain with right side sciatica and leg pain.     MRI Lumbar Spine 3/17/25. Impression:  Lumbar degenerative disc disease and spondylosis level by level discussed above. These findings are most pronounced at the level of L5-S1.     CT Lumbar Spine 3/17/25. Impression:  No acute fracture identified.  Multilevel degenerative changes of the lumbar spine, with suspected disc extrusion at L4-5.    Per referring 3/17/25 ED note:  Patient is a 75 y/o male with a PMHx of HTN and HLD presents to the ED for dizziness beginning ~4:45 this morning. Patient reports turning in bed and feeling the room spin. He reports going to bed at 0:00 this morning. He reports he was unable to stand up due to dizziness. He reports unchanged right thigh pain, sees Dr. Andres. He reports having hardware in both knees. He reports history of a pinched nerve in his back. He denies weakness, numbness, nausea, vomiting, fever, headache, chest pain, or shortness of breath.   Per ortho 2/25/25 progress note:  He reports experiencing back pain that began on Sunday, accompanied by numbness and cramping sensations. He also describes muscle spasms and requests a prescription for potent analgesics.     Patient states pain has been present for about 6 weeks due to no specific incident.

## 2025-04-22 ENCOUNTER — OFFICE VISIT (OUTPATIENT)
Dept: ORTHOPEDICS | Facility: CLINIC | Age: 75
End: 2025-04-22
Payer: MEDICARE

## 2025-04-22 VITALS
HEART RATE: 87 BPM | HEIGHT: 74 IN | DIASTOLIC BLOOD PRESSURE: 94 MMHG | BODY MASS INDEX: 33.37 KG/M2 | WEIGHT: 260 LBS | SYSTOLIC BLOOD PRESSURE: 136 MMHG

## 2025-04-22 DIAGNOSIS — M54.16 LUMBAR RADICULOPATHY, CHRONIC: Primary | ICD-10-CM

## 2025-04-22 PROCEDURE — 99214 OFFICE O/P EST MOD 30 MIN: CPT | Mod: ,,, | Performed by: ORTHOPAEDIC SURGERY

## 2025-04-22 RX ORDER — METHOCARBAMOL 500 MG/1
500 TABLET, FILM COATED ORAL 3 TIMES DAILY
COMMUNITY
Start: 2025-03-24

## 2025-04-22 RX ORDER — GABAPENTIN 300 MG/1
300 CAPSULE ORAL NIGHTLY
COMMUNITY
Start: 2025-03-24

## 2025-04-22 NOTE — PROGRESS NOTES
Chief Complaint:   Chief Complaint   Patient presents with    Follow-up     Po knee pain - Pt started seeing neurology for back problems. Pt reports pain in his upper thigh, denies hip or knee pain. Reporting aching pain in his thighs, about 8-9 on pain scale. Pt taking gabapentin and Tylenol for relief.        History of present illness:    History of Present Illness  The patient presents for evaluation of back pain and knee pain.    Persistent back pain is reported, attributed to rheumatoid arthritis. Pain management has been attempted with medication, though the full course was not completed. Emergency care was sought due to a severe episode, leading to a referral to a neurosurgeon. An MRI scan revealed issues at the L3 and L4 vertebrae, prompting a discussion about potential injections as a treatment option. Further information from Medicare regarding these injections is currently awaited. During the ER visit, OxyContin was prescribed, of which 1.5 doses were taken. Pain management is supplemented with Tylenol and gummy marijuana.    The knee condition remains stable, with no significant changes reported.    SOCIAL HISTORY  Occupations: Door business  Recreational Drugs: Marijuana gummies      Past Medical History:   Diagnosis Date    Hypertension     Mixed hyperlipidemia        Past Surgical History:   Procedure Laterality Date    TOTAL KNEE ARTHROPLASTY Bilateral     2018       Current Outpatient Medications   Medication Sig    amLODIPine (NORVASC) 5 MG tablet Take 5 mg by mouth.    aspirin (ECOTRIN) 81 MG EC tablet 1 tablet Orally twice a day    atorvastatin (LIPITOR) 10 MG tablet Take 10 mg by mouth every evening.    empagliflozin (JARDIANCE) 10 mg tablet Take 10 mg by mouth once daily.    finasteride (PROSCAR) 5 mg tablet Take 5 mg by mouth.    gabapentin (NEURONTIN) 300 MG capsule Take 300 mg by mouth every evening.    lisinopriL (PRINIVIL,ZESTRIL) 20 MG tablet Take 20 mg by mouth.    meclizine  (ANTIVERT) 25 mg tablet Take 1 tablet (25 mg total) by mouth 3 (three) times daily as needed for Dizziness.    methocarbamoL (ROBAXIN) 500 MG Tab Take 500 mg by mouth 3 (three) times daily.     No current facility-administered medications for this visit.       Review of patient's allergies indicates:  No Known Allergies    No family history on file.    Social History[1]        Review of Systems:    Constitution: Negative for chills, fever, and sweats.  Negative for unexplained weight loss.    HENT:  Negative for headaches and blurry vision.    Cardiovascular:Negative for chest pain or irregular heart beat. Negative for hypertension.    Respiratory:  Negative for cough and shortness of breath.    Gastrointestinal: Negative for abdominal pain, heartburn, melena, nausea, and vomitting.    Genitourinary:  Negative bladder incontinence and dysuria.    Musculoskeletal:  See HPI    Neurological: Negative for numbness.    Psychiatric/Behavioral: Negative for depression.  The patient is not nervous/anxious.      Endocrine: Negative for polyuria    Hematologic/Lymphatic: Negative for bleeding problem.  Does not bruise/bleed easily.    Skin: Negative for poor would healing and rash      Physical Examination:    Vital Signs:    Vitals:    04/22/25 1428   BP: (!) 136/94   Pulse: 87       Body mass index is 33.38 kg/m².    General: No acute distress, alert and oriented, healthy appearing    HEENT: Head is atraumatic, mucous membranes are moist    Neck: Supples, no JVD    Cardiovascular: Palpable dorsalis pedis and posterior tibial pulses, regular rate and rhythm to those pulses    Lungs: Breathing non-labored    Skin: no rashes appreciated    Neurologic: Can flex and extend knees, ankles, and toes. Sensation is grossly intact    Bilateral knees:  Brisk capillary refill distally.  Sensation intact disappeared range of motion of both knees well-maintained.    X-rays:      Assessment::  Lumbar radiculopathy    Plan:  Most of the  symptoms appear to be coming from his lumbar spine.  We will hold off on further intervention at the current time.  He will contact us if there is any issues with the knees in the future.    This note was generated with the assistance of ambient listening technology. Verbal consent was obtained by the patient and accompanying visitor(s) for the recording of patient appointment to facilitate this note. I attest to having reviewed and edited the generated note for accuracy, though some syntax or spelling errors may persist. Please contact the author of this note for any clarification.      This note was created using [employer] voice recognition software that occasionally misinterpreted phrases or words.    Consult note is delivered via Epic messaging service.         [1]   Social History  Socioeconomic History    Marital status:    Tobacco Use    Smoking status: Never    Smokeless tobacco: Never   Substance and Sexual Activity    Alcohol use: Not Currently    Drug use: Yes     Types: Marijuana, Oxycodone     Comment: Medical - gummies 30mg    Sexual activity: Yes     Partners: Female